# Patient Record
Sex: FEMALE | Race: OTHER | HISPANIC OR LATINO | Employment: UNEMPLOYED | URBAN - METROPOLITAN AREA
[De-identification: names, ages, dates, MRNs, and addresses within clinical notes are randomized per-mention and may not be internally consistent; named-entity substitution may affect disease eponyms.]

---

## 2022-06-29 ENCOUNTER — HOSPITAL ENCOUNTER (EMERGENCY)
Facility: HOSPITAL | Age: 32
Discharge: HOME | End: 2022-06-29
Attending: INTERNAL MEDICINE

## 2022-06-29 VITALS
DIASTOLIC BLOOD PRESSURE: 84 MMHG | SYSTOLIC BLOOD PRESSURE: 147 MMHG | RESPIRATION RATE: 16 BRPM | BODY MASS INDEX: 24.46 KG/M2 | OXYGEN SATURATION: 100 % | HEART RATE: 82 BPM | HEIGHT: 64 IN | TEMPERATURE: 97 F | WEIGHT: 143.31 LBS

## 2022-06-29 DIAGNOSIS — R42 DIZZINESS: Primary | ICD-10-CM

## 2022-06-29 LAB
ALBUMIN SERPL-MCNC: 4.2 GM/DL (ref 3.5–5)
ALBUMIN/GLOB SERPL: 1.2 RATIO (ref 1.1–2)
ALP SERPL-CCNC: 77 UNIT/L (ref 40–150)
ALT SERPL-CCNC: 19 UNIT/L (ref 0–55)
AMPHET UR QL SCN: NEGATIVE
APPEARANCE UR: CLEAR
AST SERPL-CCNC: 19 UNIT/L (ref 5–34)
B-HCG UR QL: NEGATIVE
BACTERIA #/AREA URNS AUTO: ABNORMAL /HPF
BARBITURATE SCN PRESENT UR: NEGATIVE
BASOPHILS # BLD AUTO: 0.04 X10(3)/MCL (ref 0–0.2)
BASOPHILS NFR BLD AUTO: 0.6 %
BENZODIAZ UR QL SCN: NEGATIVE
BILIRUB UR QL STRIP.AUTO: NEGATIVE MG/DL
BILIRUBIN DIRECT+TOT PNL SERPL-MCNC: 0.5 MG/DL
BUN SERPL-MCNC: 6.9 MG/DL (ref 7–18.7)
CALCIUM SERPL-MCNC: 10 MG/DL (ref 8.4–10.2)
CANNABINOIDS UR QL SCN: NEGATIVE
CHLORIDE SERPL-SCNC: 103 MMOL/L (ref 98–107)
CK SERPL-CCNC: 69 U/L (ref 29–168)
CO2 SERPL-SCNC: 30 MMOL/L (ref 22–29)
COCAINE UR QL SCN: NEGATIVE
COLOR UR AUTO: COLORLESS
CREAT SERPL-MCNC: 0.71 MG/DL (ref 0.55–1.02)
CTP QC/QA: YES
EOSINOPHIL # BLD AUTO: 0.16 X10(3)/MCL (ref 0–0.9)
EOSINOPHIL NFR BLD AUTO: 2.3 %
ERYTHROCYTE [DISTWIDTH] IN BLOOD BY AUTOMATED COUNT: 13.1 % (ref 11.5–17)
FENTANYL UR QL SCN: NEGATIVE
GLOBULIN SER-MCNC: 3.4 GM/DL (ref 2.4–3.5)
GLUCOSE SERPL-MCNC: 107 MG/DL (ref 74–100)
GLUCOSE UR QL STRIP.AUTO: NORMAL MG/DL
HCT VFR BLD AUTO: 42.2 % (ref 37–47)
HGB BLD-MCNC: 13.5 GM/DL (ref 12–16)
HYALINE CASTS #/AREA URNS LPF: ABNORMAL /LPF
IMM GRANULOCYTES # BLD AUTO: 0.03 X10(3)/MCL (ref 0–0.02)
IMM GRANULOCYTES NFR BLD AUTO: 0.4 % (ref 0–0.43)
KETONES UR QL STRIP.AUTO: NEGATIVE MG/DL
LEUKOCYTE ESTERASE UR QL STRIP.AUTO: NEGATIVE UNIT/L
LYMPHOCYTES # BLD AUTO: 2.23 X10(3)/MCL (ref 0.6–4.6)
LYMPHOCYTES NFR BLD AUTO: 31.8 %
MCH RBC QN AUTO: 29.1 PG (ref 27–31)
MCHC RBC AUTO-ENTMCNC: 32 MG/DL (ref 33–36)
MCV RBC AUTO: 90.9 FL (ref 80–94)
MDMA UR QL SCN: NEGATIVE
MONOCYTES # BLD AUTO: 0.39 X10(3)/MCL (ref 0.1–1.3)
MONOCYTES NFR BLD AUTO: 5.6 %
MUCOUS THREADS URNS QL MICRO: ABNORMAL /LPF
NEUTROPHILS # BLD AUTO: 4.2 X10(3)/MCL (ref 2.1–9.2)
NEUTROPHILS NFR BLD AUTO: 59.3 %
NITRITE UR QL STRIP.AUTO: NEGATIVE
NRBC BLD AUTO-RTO: 0 %
OPIATES UR QL SCN: NEGATIVE
PCP UR QL: NEGATIVE
PH UR STRIP.AUTO: 7.5 [PH]
PH UR: 7.5 [PH] (ref 3–11)
PLATELET # BLD AUTO: 281 X10(3)/MCL (ref 130–400)
PMV BLD AUTO: 9.4 FL (ref 9.4–12.4)
POTASSIUM SERPL-SCNC: 4.3 MMOL/L (ref 3.5–5.1)
PROT SERPL-MCNC: 7.6 GM/DL (ref 6.4–8.3)
PROT UR QL STRIP.AUTO: NEGATIVE MG/DL
RBC # BLD AUTO: 4.64 X10(6)/MCL (ref 4.2–5.4)
RBC #/AREA URNS AUTO: ABNORMAL /HPF
RBC UR QL AUTO: NEGATIVE UNIT/L
SODIUM SERPL-SCNC: 141 MMOL/L (ref 136–145)
SP GR UR STRIP.AUTO: 1
SPECIFIC GRAVITY, URINE AUTO (.000) (OHS): 1 (ref 1–1.03)
SQUAMOUS #/AREA URNS LPF: ABNORMAL /HPF
UROBILINOGEN UR STRIP-ACNC: NORMAL MG/DL
WBC # SPEC AUTO: 7 X10(3)/MCL (ref 4.5–11.5)
WBC #/AREA URNS AUTO: ABNORMAL /HPF

## 2022-06-29 PROCEDURE — 25000003 PHARM REV CODE 250: Performed by: NURSE PRACTITIONER

## 2022-06-29 PROCEDURE — 85025 COMPLETE CBC W/AUTO DIFF WBC: CPT | Performed by: NURSE PRACTITIONER

## 2022-06-29 PROCEDURE — 81001 URINALYSIS AUTO W/SCOPE: CPT | Performed by: NURSE PRACTITIONER

## 2022-06-29 PROCEDURE — 96360 HYDRATION IV INFUSION INIT: CPT

## 2022-06-29 PROCEDURE — 80053 COMPREHEN METABOLIC PANEL: CPT | Performed by: NURSE PRACTITIONER

## 2022-06-29 PROCEDURE — 36415 COLL VENOUS BLD VENIPUNCTURE: CPT | Performed by: NURSE PRACTITIONER

## 2022-06-29 PROCEDURE — 80307 DRUG TEST PRSMV CHEM ANLYZR: CPT | Performed by: NURSE PRACTITIONER

## 2022-06-29 PROCEDURE — 81025 URINE PREGNANCY TEST: CPT | Performed by: NURSE PRACTITIONER

## 2022-06-29 PROCEDURE — 99284 EMERGENCY DEPT VISIT MOD MDM: CPT | Mod: 25

## 2022-06-29 PROCEDURE — 82550 ASSAY OF CK (CPK): CPT | Performed by: NURSE PRACTITIONER

## 2022-06-29 RX ADMIN — SODIUM CHLORIDE 1000 ML: 9 INJECTION, SOLUTION INTRAVENOUS at 03:06

## 2022-06-29 NOTE — ED PROVIDER NOTES
Encounter Date: 6/29/2022       History     Chief Complaint   Patient presents with    Dizziness     Pt to ed c/o dizziness and weakness that began yesterday. Describes the dizziness as everything around her spins. Pt reports nausea. Pt also requesting gyn referral     The patient presents with dizziness.  The onset was 1 day ago.  The course/duration of symptoms is constant and fluctuating in intensity.  The character of symptoms is lightheaded.  The degree at present is minimal.  The exacerbating factor is changing position.  There are relieving factors including lying down and rest.  Risk factors consist of none.  Prior episodes: occasional.  Therapy today: none.  Associated symptoms: mild occasional nausea, mild headache, denies chest pain, denies vomiting, denies shortness of breath, denies abdominal pain, denies syncope, denies palpitations, denies altered vision, denies altered speech, denies altered coordination, denies focal weakness, denies altered sensation, denies confusion, denies seizure, denies blood in stool and denies vaginal bleeding. Video  used.        Review of patient's allergies indicates:  No Known Allergies  History reviewed. No pertinent past medical history.  History reviewed. No pertinent surgical history.  History reviewed. No pertinent family history.  Social History     Tobacco Use    Smoking status: Never Smoker    Smokeless tobacco: Never Used   Substance Use Topics    Alcohol use: Never    Drug use: Never     Review of Systems   Constitutional: Negative for fever.   HENT: Negative for sore throat.    Respiratory: Negative for shortness of breath.    Cardiovascular: Negative for chest pain.   Gastrointestinal: Negative for nausea.   Genitourinary: Negative for dysuria.   Musculoskeletal: Negative for back pain.   Skin: Negative for rash.   Neurological: Positive for light-headedness. Negative for weakness.   Hematological: Does not bruise/bleed easily.   All other  systems reviewed and are negative.      Physical Exam     Initial Vitals [06/29/22 1352]   BP Pulse Resp Temp SpO2   (!) 157/94 78 16 97.2 °F (36.2 °C) 100 %      MAP       --         Physical Exam    Nursing note and vitals reviewed.  Constitutional: She appears well-developed and well-nourished.   HENT:   Head: Normocephalic and atraumatic.   Neck: Neck supple.   Normal range of motion.  Cardiovascular: Normal rate, regular rhythm, normal heart sounds and intact distal pulses.   Pulmonary/Chest: Breath sounds normal.   Abdominal: Abdomen is soft. Bowel sounds are normal.   Musculoskeletal:         General: Normal range of motion.      Cervical back: Normal range of motion and neck supple.     Neurological: She is alert and oriented to person, place, and time. She has normal strength. No cranial nerve deficit.   Skin: Skin is warm and dry.   Psychiatric: She has a normal mood and affect.         ED Course   Procedures  Labs Reviewed   COMPREHENSIVE METABOLIC PANEL - Abnormal; Notable for the following components:       Result Value    Carbon Dioxide 30 (*)     Glucose Level 107 (*)     Blood Urea Nitrogen 6.9 (*)     All other components within normal limits   URINALYSIS, REFLEX TO URINE CULTURE - Abnormal; Notable for the following components:    Bacteria, UA Trace (*)     Mucous, UA Trace (*)     All other components within normal limits   CBC WITH DIFFERENTIAL - Abnormal; Notable for the following components:    MCHC 32.0 (*)     IG# 0.03 (*)     All other components within normal limits   DRUG SCREEN, URINE (BEAKER) - Normal    Narrative:     Cut off concentrations:    Amphetamines - 1000 ng/ml  Barbiturates - 200 ng/ml  Benzodiazepine - 200 ng/ml  Cannabinoids (THC) - 50 ng/ml  Cocaine - 300 ng/ml  Fentanyl - 1.0 ng/ml  MDMA - 500 ng/ml  Opiates - 300 ng/ml   Phencyclidine (PCP) - 25 ng/ml    Specimen submitted for drug analysis and tested for pH and specific gravity in order to evaluate sample integrity.  Suspect tampering if specific gravity is <1.003 and/or pH is not within the range of 4.5 - 8.0  False negatives may result form substances such as bleach added to urine.  False positives may result for the presence of a substance with similar chemical structure to the drug or its metabolite.    This test provides only a PRELIMINARY analytical test result. A more specific alternate chemical method must be used in order to obtain a confirmed analytical result. Gas chromatography/mass spectrometry (GC/MS) is the preferred confirmatory method. Other chemical confirmation methods are available. Clinical consideration and professional judgement should be applied to any drug of abuse test result, particularly when preliminary positive results are used.    Positive results will be confirmed only at the physicians request. Unconfirmed screening results are to be used only for medical purposes (treatment).        CK - Normal   CBC W/ AUTO DIFFERENTIAL    Narrative:     The following orders were created for panel order CBC auto differential.  Procedure                               Abnormality         Status                     ---------                               -----------         ------                     CBC with Differential[[id number]]        Abnormal            Final result                 Please view results for these tests on the individual orders.   EXTRA TUBES    Narrative:     The following orders were created for panel order EXTRA TUBES.  Procedure                               Abnormality         Status                     ---------                               -----------         ------                     Light Blue Top Hold[[id number]]                              In process                 Gold Top Hold[[id number]]                                    In process                   Please view results for these tests on the individual orders.   LIGHT BLUE TOP HOLD   GOLD TOP HOLD   POCT URINE PREGNANCY           Imaging Results    None          Medications   sodium chloride 0.9% bolus 1,000 mL (0 mLs Intravenous Stopped 6/29/22 7549)     Medical Decision Making:   History:   Old Records Summarized: records from clinic visits and records from previous admission(s).  Feels better after ivf and wishes to go home, will refer to the medicine clinic per request for a pcp and the gyn clinic per request for a routine pap smear, strict return to ER instructions given    4:41 PM DISPOSITION: The patient is resting comfortably in no acute distress.  She is hemodynamically stable and is without objective evidence for acute process requiring urgent intervention or hospitalization. I provided counseling to patient with regard to condition, the treatment plan, specific conditions for return, and the importance of follow up. Detailed written and verbal instructions provided to patient and she expressed a verbal understanding of the discharge instructions and overall management plan. Reiterated the importance of medication administration and safety and advised patient to follow up with primary care provider in 3-5 days or sooner if needed.  Answered questions at this time. The patient is stable for discharge. Video  was used.                      Clinical Impression:   Final diagnoses:  [R42] Dizziness (Primary)          ED Disposition Condition    Discharge Stable        ED Prescriptions     None        Follow-up Information     Follow up With Specialties Details Why Contact Info    referrals sent to medicine clinic per request for a pcp        referral sent to GYN clinic per request for routine pap smear        [facility] Emergency Medicine  If symptoms worsen [address] [facility] [id number] [phone]           Anastacio Oconnor, TIMOTHY  06/29/22 6829

## 2023-07-14 ENCOUNTER — HOSPITAL ENCOUNTER (EMERGENCY)
Facility: HOSPITAL | Age: 33
Discharge: HOME | End: 2023-07-14
Attending: INTERNAL MEDICINE

## 2023-07-14 VITALS
DIASTOLIC BLOOD PRESSURE: 64 MMHG | HEIGHT: 64 IN | HEART RATE: 96 BPM | TEMPERATURE: 98 F | RESPIRATION RATE: 18 BRPM | BODY MASS INDEX: 24.84 KG/M2 | WEIGHT: 145.5 LBS | OXYGEN SATURATION: 100 % | SYSTOLIC BLOOD PRESSURE: 114 MMHG

## 2023-07-14 DIAGNOSIS — O03.4 INCOMPLETE MISCARRIAGE: ICD-10-CM

## 2023-07-14 DIAGNOSIS — R10.9 ABDOMINAL PAIN, UNSPECIFIED ABDOMINAL LOCATION: Primary | ICD-10-CM

## 2023-07-14 DIAGNOSIS — O03.4 INCOMPLETE ABORTION: ICD-10-CM

## 2023-07-14 LAB
ABORH RETYPE: NORMAL
ADDITIONAL FINDINGS (OHS): ABNORMAL
ALBUMIN SERPL-MCNC: 3.4 G/DL (ref 3.5–5)
ALBUMIN/GLOB SERPL: 0.9 RATIO (ref 1.1–2)
ALP SERPL-CCNC: 61 UNIT/L (ref 40–150)
ALT SERPL-CCNC: 10 UNIT/L (ref 0–55)
APPEARANCE UR: ABNORMAL
AST SERPL-CCNC: 11 UNIT/L (ref 5–34)
B-HCG FREE SERPL-ACNC: ABNORMAL MIU/ML
B-HCG UR QL: POSITIVE
BACTERIA #/AREA URNS AUTO: ABNORMAL /HPF
BASOPHILS # BLD AUTO: 0.04 X10(3)/MCL
BASOPHILS NFR BLD AUTO: 0.2 %
BILIRUB UR QL STRIP.AUTO: NEGATIVE
BILIRUBIN DIRECT+TOT PNL SERPL-MCNC: 0.4 MG/DL
BUN SERPL-MCNC: <3 MG/DL (ref 7–18.7)
C TRACH DNA SPEC QL NAA+PROBE: NOT DETECTED
CALCIUM SERPL-MCNC: 9.2 MG/DL (ref 8.4–10.2)
CHLORIDE SERPL-SCNC: 105 MMOL/L (ref 98–107)
CO2 SERPL-SCNC: 21 MMOL/L (ref 22–29)
COLOR UR: YELLOW
CREAT SERPL-MCNC: 0.67 MG/DL (ref 0.55–1.02)
CTP QC/QA: YES
EOSINOPHIL # BLD AUTO: 0.02 X10(3)/MCL (ref 0–0.9)
EOSINOPHIL NFR BLD AUTO: 0.1 %
ERYTHROCYTE [DISTWIDTH] IN BLOOD BY AUTOMATED COUNT: 13.4 % (ref 11.5–17)
GFR SERPLBLD CREATININE-BSD FMLA CKD-EPI: >60 MLS/MIN/1.73/M2
GLOBULIN SER-MCNC: 3.6 GM/DL (ref 2.4–3.5)
GLUCOSE SERPL-MCNC: 113 MG/DL (ref 74–100)
GLUCOSE UR QL STRIP.AUTO: NORMAL
GROUP & RH: NORMAL
GROUP & RH: NORMAL
HCT VFR BLD AUTO: 35.8 % (ref 37–47)
HGB BLD-MCNC: 12.4 G/DL (ref 12–16)
HYALINE CASTS #/AREA URNS LPF: ABNORMAL /LPF
IMM GRANULOCYTES # BLD AUTO: 0.17 X10(3)/MCL (ref 0–0.04)
IMM GRANULOCYTES NFR BLD AUTO: 0.7 %
INDIRECT COOMBS GEL: NORMAL
KETONES UR QL STRIP.AUTO: ABNORMAL
LEUKOCYTE ESTERASE UR QL STRIP.AUTO: 500
LYMPHOCYTES # BLD AUTO: 0.8 X10(3)/MCL (ref 0.6–4.6)
LYMPHOCYTES NFR BLD AUTO: 3.4 %
MCH RBC QN AUTO: 30.5 PG (ref 27–31)
MCHC RBC AUTO-ENTMCNC: 34.6 G/DL (ref 33–36)
MCV RBC AUTO: 88 FL (ref 80–94)
MONOCYTES # BLD AUTO: 0.82 X10(3)/MCL (ref 0.1–1.3)
MONOCYTES NFR BLD AUTO: 3.5 %
MUCOUS THREADS URNS QL MICRO: ABNORMAL /LPF
N GONORRHOEA DNA SPEC QL NAA+PROBE: NOT DETECTED
NEUTROPHILS # BLD AUTO: 21.59 X10(3)/MCL (ref 2.1–9.2)
NEUTROPHILS NFR BLD AUTO: 92.1 %
NITRITE UR QL STRIP.AUTO: NEGATIVE
NRBC BLD AUTO-RTO: 0 %
PH UR STRIP.AUTO: 6 [PH]
PLATELET # BLD AUTO: 262 X10(3)/MCL (ref 130–400)
PLATELET # BLD EST: ADEQUATE 10*3/UL
PMV BLD AUTO: 9.6 FL (ref 7.4–10.4)
POTASSIUM SERPL-SCNC: 3.1 MMOL/L (ref 3.5–5.1)
PROT SERPL-MCNC: 7 GM/DL (ref 6.4–8.3)
PROT UR QL STRIP.AUTO: ABNORMAL
RBC # BLD AUTO: 4.07 X10(6)/MCL (ref 4.2–5.4)
RBC #/AREA URNS AUTO: ABNORMAL /HPF
RBC MORPH BLD: ABNORMAL
RBC UR QL AUTO: ABNORMAL
SODIUM SERPL-SCNC: 135 MMOL/L (ref 136–145)
SP GR UR STRIP.AUTO: 1.02
SPECIMEN OUTDATE: NORMAL
SQUAMOUS #/AREA URNS LPF: ABNORMAL /HPF
UROBILINOGEN UR STRIP-ACNC: NORMAL
WBC # SPEC AUTO: 23.44 X10(3)/MCL (ref 4.5–11.5)
WBC #/AREA URNS AUTO: ABNORMAL /HPF

## 2023-07-14 PROCEDURE — 63600175 PHARM REV CODE 636 W HCPCS: Performed by: PHYSICIAN ASSISTANT

## 2023-07-14 PROCEDURE — 84702 CHORIONIC GONADOTROPIN TEST: CPT | Performed by: PHYSICIAN ASSISTANT

## 2023-07-14 PROCEDURE — 86900 BLOOD TYPING SEROLOGIC ABO: CPT | Mod: 91 | Performed by: PHYSICIAN ASSISTANT

## 2023-07-14 PROCEDURE — 99285 EMERGENCY DEPT VISIT HI MDM: CPT | Mod: 25

## 2023-07-14 PROCEDURE — 85025 COMPLETE CBC W/AUTO DIFF WBC: CPT | Performed by: PHYSICIAN ASSISTANT

## 2023-07-14 PROCEDURE — 96361 HYDRATE IV INFUSION ADD-ON: CPT

## 2023-07-14 PROCEDURE — 86900 BLOOD TYPING SEROLOGIC ABO: CPT | Performed by: PHYSICIAN ASSISTANT

## 2023-07-14 PROCEDURE — 96374 THER/PROPH/DIAG INJ IV PUSH: CPT

## 2023-07-14 PROCEDURE — 87591 N.GONORRHOEAE DNA AMP PROB: CPT

## 2023-07-14 PROCEDURE — 25000003 PHARM REV CODE 250

## 2023-07-14 PROCEDURE — 80053 COMPREHEN METABOLIC PANEL: CPT | Performed by: PHYSICIAN ASSISTANT

## 2023-07-14 PROCEDURE — 87088 URINE BACTERIA CULTURE: CPT | Performed by: PHYSICIAN ASSISTANT

## 2023-07-14 PROCEDURE — 81025 URINE PREGNANCY TEST: CPT | Performed by: PHYSICIAN ASSISTANT

## 2023-07-14 PROCEDURE — 25000003 PHARM REV CODE 250: Performed by: PHYSICIAN ASSISTANT

## 2023-07-14 PROCEDURE — 81001 URINALYSIS AUTO W/SCOPE: CPT | Performed by: PHYSICIAN ASSISTANT

## 2023-07-14 RX ORDER — MORPHINE SULFATE 2 MG/ML
2 INJECTION, SOLUTION INTRAMUSCULAR; INTRAVENOUS
Status: COMPLETED | OUTPATIENT
Start: 2023-07-14 | End: 2023-07-14

## 2023-07-14 RX ORDER — HYDROCODONE BITARTRATE AND ACETAMINOPHEN 5; 325 MG/1; MG/1
1 TABLET ORAL
Status: COMPLETED | OUTPATIENT
Start: 2023-07-14 | End: 2023-07-14

## 2023-07-14 RX ORDER — MISOPROSTOL 200 UG/1
800 TABLET ORAL DAILY
Qty: 4 TABLET | Refills: 0 | Status: SHIPPED | OUTPATIENT
Start: 2023-07-14 | End: 2023-07-15

## 2023-07-14 RX ORDER — MISOPROSTOL 200 UG/1
800 TABLET ORAL DAILY
Qty: 4 TABLET | Refills: 0 | Status: SHIPPED | OUTPATIENT
Start: 2023-07-14 | End: 2023-07-14 | Stop reason: SDUPTHER

## 2023-07-14 RX ORDER — KETOROLAC TROMETHAMINE 10 MG/1
10 TABLET, FILM COATED ORAL EVERY 6 HOURS
Qty: 20 TABLET | Refills: 0 | Status: SHIPPED | OUTPATIENT
Start: 2023-07-14 | End: 2023-07-19

## 2023-07-14 RX ORDER — ACETAMINOPHEN 325 MG/1
650 TABLET ORAL
Status: COMPLETED | OUTPATIENT
Start: 2023-07-14 | End: 2023-07-14

## 2023-07-14 RX ORDER — MISOPROSTOL 100 UG/1
800 TABLET ORAL ONCE
Status: COMPLETED | OUTPATIENT
Start: 2023-07-14 | End: 2023-07-14

## 2023-07-14 RX ORDER — MISOPROSTOL 100 UG/1
800 TABLET ORAL ONCE
Status: DISCONTINUED | OUTPATIENT
Start: 2023-07-14 | End: 2023-07-14

## 2023-07-14 RX ADMIN — MORPHINE SULFATE 2 MG: 2 INJECTION, SOLUTION INTRAMUSCULAR; INTRAVENOUS at 02:07

## 2023-07-14 RX ADMIN — SODIUM CHLORIDE, POTASSIUM CHLORIDE, SODIUM LACTATE AND CALCIUM CHLORIDE 1000 ML: 600; 310; 30; 20 INJECTION, SOLUTION INTRAVENOUS at 02:07

## 2023-07-14 RX ADMIN — MISOPROSTOL 800 MCG: 100 TABLET ORAL at 02:07

## 2023-07-14 RX ADMIN — ACETAMINOPHEN 650 MG: 325 TABLET, FILM COATED ORAL at 11:07

## 2023-07-14 RX ADMIN — HYDROCODONE BITARTRATE AND ACETAMINOPHEN 1 TABLET: 5; 325 TABLET ORAL at 05:07

## 2023-07-14 NOTE — H&P
[facility] OB/GYN CLINIC NOTE  [facility]  [address] [place] [address]  Phone: [phone]  Fax: [phone]    [facility] Gynecology Consult - Resident Note    Consulting Physician: Lm  Reason for consult: incomplete   Subjective:        HPI:   Tiff Carrero is an 32 y.o. year old  ( vaginal x4) with no PMHx who presents to ED with abominal pain. Reports adbominal cramping that started last night. On admission b-HCG quant was 69k . Pt states that her LMP was in April ( approx )    Abdominal US revealed intrauterine pregnancy with complete absence of amniotic fluid and suspected fetus at the lower uterine segment/cervical os. On physical examination by ED physician, a fetus was removed from the vaginal vault and the umbilical cord was noted to be detached. Placenta presumed to be still intact. After removal of products of conception, there was moderate intermittent bleeding coming from the cervical os.    Review of systems  Negative unless noted in HPI    Past Medical History  No PMHx    Past Surgical History  Denies hx of surgery    Obstetrical History    Vaginal delivery x4   Children born in [place]     Gynecologic History  Patient's last menstrual period was 2023 (approximate).      Allergies  Denies     Family History  No family history on file.    Social History  Social History     Tobacco Use    Smoking status: Never    Smokeless tobacco: Never   Substance Use Topics    Alcohol use: Never    Drug use: Never       Overview of active problems  There are no problems to display for this patient.      Medications  Home medications  (Not in a hospital admission)      Current Inpatient medications  No current facility-administered medications for this encounter.    Current Outpatient Medications:     miSOPROStoL (CYTOTEC) 200 MCG Tab, Place 4 tablets (800 mcg total) inside cheek once daily. for 1 day, Disp: 4 tablet, Rfl: 0       Objective:     Vitals:    23 1413  07/14/23 1428 07/14/23 1429 07/14/23 1443   BP: 110/67 111/74  130/83   BP Location:       Patient Position:       Pulse: 107 103  101   Resp:   18 18   Temp:       TempSrc:       SpO2: 99% 98%  95%   Weight:       Height:         Body mass index is 24.98 kg/m².    No intake/output data recorded.    Physical Exam:  General: alert and oriented, in no acute distress   Lungs: clear to auscultation bilaterally   Heart: regular rate and rhythm   Abdomen: Soft, non-distended, non-tender   Bowel Sounds: Active   DVT Evaluation: No cords or calf tenderness.  No significant calf/ankle edema.   Extremities: Normal, atraumatic, non-edematous   External genitalia: Normal female genitalia without lesion, discharge or tenderness. Blood noted on bilateral labia   Bimanual Exam: No cervical motion tenderness. SVE 1 cm dilated. Uterus freely mobile. Normal size uterus. No adnexal masses. Appropriately tender to palpoation    Speculum Exam: Vaginal vault with copious blood. Ringed forceps removed blood clots obscuring the cervix. Cervix visualized closed with blood coming from cervical os.   Note: RN chaperone present for entirety of exam.    Results:     LABS  Trended:  Recent Labs   Lab 07/14/23  1114   WBC 23.44*   HGB 12.4   HCT 35.8*      MCV 88.0   RDW 13.4   *   K 3.1*   CO2 21*   BUN <3.0*   CREATININE 0.67   CALCIUM 9.2   ALBUMIN 3.4*   BILITOT 0.4   AST 11   ALT 10   ALKPHOS 61     Recent Results (from the past 24 hour(s))   Comprehensive Metabolic Panel    Collection Time: 07/14/23 11:14 AM   Result Value Ref Range    Sodium Level 135 (L) 136 - 145 mmol/L    Potassium Level 3.1 (L) 3.5 - 5.1 mmol/L    Chloride 105 98 - 107 mmol/L    Carbon Dioxide 21 (L) 22 - 29 mmol/L    Glucose Level 113 (H) 74 - 100 mg/dL    Blood Urea Nitrogen <3.0 (L) 7.0 - 18.7 mg/dL    Creatinine 0.67 0.55 - 1.02 mg/dL    Calcium Level Total 9.2 8.4 - 10.2 mg/dL    Protein Total 7.0 6.4 - 8.3 gm/dL    Albumin Level 3.4 (L) 3.5 - 5.0  g/dL    Globulin 3.6 (H) 2.4 - 3.5 gm/dL    Albumin/Globulin Ratio 0.9 (L) 1.1 - 2.0 ratio    Bilirubin Total 0.4 <=1.5 mg/dL    Alkaline Phosphatase 61 40 - 150 unit/L    Alanine Aminotransferase 10 0 - 55 unit/L    Aspartate Aminotransferase 11 5 - 34 unit/L    eGFR >60 mls/min/1.73/m2   CBC with Differential    Collection Time: 07/14/23 11:14 AM   Result Value Ref Range    WBC 23.44 (H) 4.50 - 11.50 x10(3)/mcL    RBC 4.07 (L) 4.20 - 5.40 x10(6)/mcL    Hgb 12.4 12.0 - 16.0 g/dL    Hct 35.8 (L) 37.0 - 47.0 %    MCV 88.0 80.0 - 94.0 fL    MCH 30.5 27.0 - 31.0 pg    MCHC 34.6 33.0 - 36.0 g/dL    RDW 13.4 11.5 - 17.0 %    Platelet 262 130 - 400 x10(3)/mcL    MPV 9.6 7.4 - 10.4 fL    Neut % 92.1 %    Lymph % 3.4 %    Mono % 3.5 %    Eos % 0.1 %    Basophil % 0.2 %    Lymph # 0.80 0.6 - 4.6 x10(3)/mcL    Neut # 21.59 (H) 2.1 - 9.2 x10(3)/mcL    Mono # 0.82 0.1 - 1.3 x10(3)/mcL    Eos # 0.02 0 - 0.9 x10(3)/mcL    Baso # 0.04 <=0.2 x10(3)/mcL    IG# 0.17 (H) 0 - 0.04 x10(3)/mcL    IG% 0.7 %    NRBC% 0.0 %   hCG, quantitative, pregnancy    Collection Time: 07/14/23 11:14 AM   Result Value Ref Range    Beta Human Chorionic Gonadotropin Quantitative 69,348.08 (H) <=5.00 mIU/mL   Blood Smear Microscopic Exam    Collection Time: 07/14/23 11:14 AM   Result Value Ref Range    RBC Morph Abnormal (A) Normal    Additional Findings White cell count verified on smear     Platelets Adequate Normal, Adequate   ABORH RETYPE    Collection Time: 07/14/23 11:14 AM   Result Value Ref Range    ABORH Retype O POS    POCT urine pregnancy    Collection Time: 07/14/23 11:28 AM   Result Value Ref Range    POC Preg Test, Ur Positive (A) Negative     Acceptable Yes    Urinalysis, Reflex to Urine Culture    Collection Time: 07/14/23 11:47 AM    Specimen: Urine   Result Value Ref Range    Color, UA Yellow Yellow, Light-Yellow, Dark Yellow, Martha, Straw    Appearance, UA Turbid (A) Clear    Specific Gravity, UA 1.016     pH, UA 6.0 5.0  - 8.5    Protein, UA Trace (A) Negative    Glucose, UA Normal Negative, Normal    Ketones, UA Trace (A) Negative    Blood, UA 3+ (A) Negative    Bilirubin, UA Negative Negative    Urobilinogen, UA Normal 0.2, 1.0, Normal    Nitrites, UA Negative Negative    Leukocyte Esterase,  (A) Negative    WBC, UA 51-99 (A) None Seen, 0-2, 3-5, 0-5 /HPF    Bacteria, UA None Seen None Seen /HPF    Squamous Epithelial Cells, UA Moderate (A) None Seen /HPF    Mucous, UA Trace (A) None Seen /LPF    Hyaline Casts, UA None Seen None Seen /lpf    RBC, UA 0-5 None Seen, 0-2, 3-5, 0-5 /HPF   Type & Screen    Collection Time: 07/14/23  2:23 PM   Result Value Ref Range    Group & Rh O POS     Indirect Adan GEL NEG     Specimen Outdate 07/17/2023 23:59    ABO/Rh    Collection Time: 07/14/23  2:23 PM   Result Value Ref Range    Group & Rh O POS         MICRO:  No pertinent new    IMAGING    TVUS 07/14:  TECHNIQUE:  Transabdominal sonography of the pelvis was performed.     COMPARISON:  No relevant prior available for comparison.     FINDINGS:  The uterus measures 14.2 x 9.7 x 8.9 cm.     Abnormal appearance of the uterus and uterine contents.  There is what appears to be an intrauterine gestation with complete absence of amniotic fluid.  Echogenic area at the posterior uterine fundus is favored to represent placenta.   Structure favored to represent the fetus is seen the lower uterine segment/internal cervical os.  This is poorly delineated in the collapsed amniotic sac.  As such difficult to assess cardiac activity on these transabdominal images.     Along the margin of the placenta within the endometrial cavity there are bright echogenic spectral reflectors with dirty shadowing suggesting gas as can be seen with infection.     RIGHT ADNEXA: Normal     LEFT ADNEXA: Normal.     PERITONEUM:No significant free intraperitoneal fluid.     Impression:     Intrauterine pregnancy with complete absence of amniotic fluid and suspected fetus  at the lower uterine segment/cervical os most compatible with ruptured membranes.  It was difficult to confidently identify the fetus in the absence of amniotic fluid and resultant poor acoustic window; as such unable to confidently assess cardiac activity.  (Note: LMP dating 14 weeks 6 days.  Unable to perform fetal biometry for sonographic dating.)     Bright echogenic reflectors at the endometrial cavity with dirty acoustic shadowing suspicious for gas.  Correlate for infection.     Recommend OBGYN evaluation.     This report was flagged in Epic as abnormal.        Electronically signed by: Ashley Adkins  Date:                                            2023  Time:                                           13:56     Assessment:     Tiff Carrero is a 32 y.o. .Seen in the ED.    [facility] consulted for assistance with incomplete .      Recommendations:     Incomplete :   - LMP .   -b-HCG : 69K  -US 0714:  with evidence of fetal products in the lower uterine segment/ endocervix    -bedside US: without evidence of intrauterine pregnancy. Blood products visualize within the intrauterine cavity.   -GC/CT pending    Counseling :   - Patient having miscarriage diagnosed  delivery of fetus and ultrasound findings  - She is Rh positive. Thus not requiring RhoGAM administration.  - She is hemodynamically stable by vitals signs, and cbc, and shows no signs of infection  - She was counseled on options including medical, surgical and expectant management with plan to proceed with medical management  - Discussed early miscarriage likely consistent with aneuploidy   - Placed 800 mcg cytotec vaginally in the ED with plan for an additional dose in 24 hours, also given toradol, percocet and zofran for symptom control  - Discussed strict return precautions and indications for surgical managements     Discussed with staff, Dr. Provost Simona Mckeon MD   [facility], PGY2

## 2023-07-14 NOTE — CONSULTS
[facility] OB/GYN CLINIC NOTE  [facility]  [address] [place] [address]  Phone: [phone]  Fax: [phone]    [facility] Gynecology Consult - Resident Note    Consulting Physician: Lm  Reason for consult: incomplete   Subjective:        HPI:   Tiff Carrero is an 32 y.o. year old  ( vaginal x4) with no PMHx who presented to ED with abdominal cramping that started last night. On admission b-HCG quant was 69k . Pt states that her LMP was in April ( approx ).    Abdominal US revealed intrauterine pregnancy with complete absence of amniotic fluid and suspected fetus at the lower uterine segment/cervical os. After US completion, fetal parts palpated un vaginal vault; fetus removed and umbilical cord was noted to be detached. Placenta presumed to be still intrauterine. After removal of products of conception, patient experienced moderate intermittent bleeding coming from the cervical os. Denies fever, chills, lightheadedness, weakness, SOB.    Review of systems  Negative unless noted in HPI    Past Medical History  No PMHx    Past Surgical History  Denies hx of surgery    Obstetrical History    Vaginal delivery x4   Children born in [place]     Gynecologic History  Patient's last menstrual period was 2023 (approximate).  Denies h/o STD    Allergies  Denies     Social History  Social History     Tobacco Use    Smoking status: Never    Smokeless tobacco: Never   Substance Use Topics    Alcohol use: Never    Drug use: Never       Medications  Home medications  none    Current Inpatient medications  No current facility-administered medications for this encounter.    Current Outpatient Medications:     miSOPROStoL (CYTOTEC) 200 MCG Tab, Place 4 tablets (800 mcg total) inside cheek once daily. for 1 day, Disp: 4 tablet, Rfl: 0       Objective:     Vitals:    23 1413 23 1428 23 1429 23 1443   BP: 110/67 111/74  130/83   BP Location:       Patient Position:        Pulse: 107 103  101   Resp:   18 18   Temp:       TempSrc:       SpO2: 99% 98%  95%   Weight:       Height:         Body mass index is 24.98 kg/m².    No intake/output data recorded.    Physical Exam:  General: alert and oriented, in no acute distress   Lungs: clear to auscultation bilaterally   Heart: regular rate and rhythm   Abdomen: Soft, non-distended, mildly ttp without guarding or rebound   Bowel Sounds: Active   DVT Evaluation: No cords or calf tenderness.  No significant calf/ankle edema.   Extremities: Normal, atraumatic, non-edematous   External genitalia: Normal female genitalia without lesion, discharge or tenderness. Blood noted on bilateral labia   Bimanual Exam: No cervical motion tenderness. Cervix 1 cm dilated. Uterus freely mobile. Normal size uterus. No adnexal masses. Appropriately tender to palpation   Speculum Exam: Vaginal vault with large amount of blood clots on speculum exam. Ringed forceps removed blood clots obscuring the cervix. Once vault evacuated of clots, cervix visualized as open, with slow bleeding. No purulent discharge or malodor.   Note: RN chaperone present for entirety of exam.    Results:     LABS  Trended:  Recent Labs   Lab 07/14/23  1114   WBC 23.44*   HGB 12.4   HCT 35.8*      MCV 88.0   RDW 13.4   *   K 3.1*   CO2 21*   BUN <3.0*   CREATININE 0.67   CALCIUM 9.2   ALBUMIN 3.4*   BILITOT 0.4   AST 11   ALT 10   ALKPHOS 61     Recent Results (from the past 24 hour(s))   Comprehensive Metabolic Panel    Collection Time: 07/14/23 11:14 AM   Result Value Ref Range    Sodium Level 135 (L) 136 - 145 mmol/L    Potassium Level 3.1 (L) 3.5 - 5.1 mmol/L    Chloride 105 98 - 107 mmol/L    Carbon Dioxide 21 (L) 22 - 29 mmol/L    Glucose Level 113 (H) 74 - 100 mg/dL    Blood Urea Nitrogen <3.0 (L) 7.0 - 18.7 mg/dL    Creatinine 0.67 0.55 - 1.02 mg/dL    Calcium Level Total 9.2 8.4 - 10.2 mg/dL    Protein Total 7.0 6.4 - 8.3 gm/dL    Albumin Level 3.4 (L) 3.5 - 5.0  g/dL    Globulin 3.6 (H) 2.4 - 3.5 gm/dL    Albumin/Globulin Ratio 0.9 (L) 1.1 - 2.0 ratio    Bilirubin Total 0.4 <=1.5 mg/dL    Alkaline Phosphatase 61 40 - 150 unit/L    Alanine Aminotransferase 10 0 - 55 unit/L    Aspartate Aminotransferase 11 5 - 34 unit/L    eGFR >60 mls/min/1.73/m2   CBC with Differential    Collection Time: 07/14/23 11:14 AM   Result Value Ref Range    WBC 23.44 (H) 4.50 - 11.50 x10(3)/mcL    RBC 4.07 (L) 4.20 - 5.40 x10(6)/mcL    Hgb 12.4 12.0 - 16.0 g/dL    Hct 35.8 (L) 37.0 - 47.0 %    MCV 88.0 80.0 - 94.0 fL    MCH 30.5 27.0 - 31.0 pg    MCHC 34.6 33.0 - 36.0 g/dL    RDW 13.4 11.5 - 17.0 %    Platelet 262 130 - 400 x10(3)/mcL    MPV 9.6 7.4 - 10.4 fL    Neut % 92.1 %    Lymph % 3.4 %    Mono % 3.5 %    Eos % 0.1 %    Basophil % 0.2 %    Lymph # 0.80 0.6 - 4.6 x10(3)/mcL    Neut # 21.59 (H) 2.1 - 9.2 x10(3)/mcL    Mono # 0.82 0.1 - 1.3 x10(3)/mcL    Eos # 0.02 0 - 0.9 x10(3)/mcL    Baso # 0.04 <=0.2 x10(3)/mcL    IG# 0.17 (H) 0 - 0.04 x10(3)/mcL    IG% 0.7 %    NRBC% 0.0 %   hCG, quantitative, pregnancy    Collection Time: 07/14/23 11:14 AM   Result Value Ref Range    Beta Human Chorionic Gonadotropin Quantitative 69,348.08 (H) <=5.00 mIU/mL   Blood Smear Microscopic Exam    Collection Time: 07/14/23 11:14 AM   Result Value Ref Range    RBC Morph Abnormal (A) Normal    Additional Findings White cell count verified on smear     Platelets Adequate Normal, Adequate   ABORH RETYPE    Collection Time: 07/14/23 11:14 AM   Result Value Ref Range    ABORH Retype O POS    POCT urine pregnancy    Collection Time: 07/14/23 11:28 AM   Result Value Ref Range    POC Preg Test, Ur Positive (A) Negative     Acceptable Yes    Urinalysis, Reflex to Urine Culture    Collection Time: 07/14/23 11:47 AM    Specimen: Urine   Result Value Ref Range    Color, UA Yellow Yellow, Light-Yellow, Dark Yellow, Martha, Straw    Appearance, UA Turbid (A) Clear    Specific Gravity, UA 1.016     pH, UA 6.0 5.0  - 8.5    Protein, UA Trace (A) Negative    Glucose, UA Normal Negative, Normal    Ketones, UA Trace (A) Negative    Blood, UA 3+ (A) Negative    Bilirubin, UA Negative Negative    Urobilinogen, UA Normal 0.2, 1.0, Normal    Nitrites, UA Negative Negative    Leukocyte Esterase,  (A) Negative    WBC, UA 51-99 (A) None Seen, 0-2, 3-5, 0-5 /HPF    Bacteria, UA None Seen None Seen /HPF    Squamous Epithelial Cells, UA Moderate (A) None Seen /HPF    Mucous, UA Trace (A) None Seen /LPF    Hyaline Casts, UA None Seen None Seen /lpf    RBC, UA 0-5 None Seen, 0-2, 3-5, 0-5 /HPF   Type & Screen    Collection Time: 07/14/23  2:23 PM   Result Value Ref Range    Group & Rh O POS     Indirect Adan GEL NEG     Specimen Outdate 07/17/2023 23:59    ABO/Rh    Collection Time: 07/14/23  2:23 PM   Result Value Ref Range    Group & Rh O POS         MICRO:  No pertinent new    IMAGING    TVUS 07/14:  TECHNIQUE:  Transabdominal sonography of the pelvis was performed.     COMPARISON:  No relevant prior available for comparison.     FINDINGS:  The uterus measures 14.2 x 9.7 x 8.9 cm.     Abnormal appearance of the uterus and uterine contents.  There is what appears to be an intrauterine gestation with complete absence of amniotic fluid.  Echogenic area at the posterior uterine fundus is favored to represent placenta.   Structure favored to represent the fetus is seen the lower uterine segment/internal cervical os.  This is poorly delineated in the collapsed amniotic sac.  As such difficult to assess cardiac activity on these transabdominal images.     Along the margin of the placenta within the endometrial cavity there are bright echogenic spectral reflectors with dirty shadowing suggesting gas as can be seen with infection.     RIGHT ADNEXA: Normal     LEFT ADNEXA: Normal.     PERITONEUM:No significant free intraperitoneal fluid.     Impression:     Intrauterine pregnancy with complete absence of amniotic fluid and suspected fetus  at the lower uterine segment/cervical os most compatible with ruptured membranes.  It was difficult to confidently identify the fetus in the absence of amniotic fluid and resultant poor acoustic window; as such unable to confidently assess cardiac activity.  (Note: LMP dating 14 weeks 6 days.  Unable to perform fetal biometry for sonographic dating.)     Bright echogenic reflectors at the endometrial cavity with dirty acoustic shadowing suspicious for gas.  Correlate for infection.     Recommend OBGYN evaluation.     This report was flagged in Epic as abnormal.        Electronically signed by: Ashley Adkins  Date:                                            2023  Time:                                           13:56    Bedside US after removal of fetus from vault:  intrauterine pregnancy not visualized. Placental and blood clot noted within the intrauterine cavity.      Assessment:     Tiff Carrero is a 32 y.o.  with incomplete .  [facility] consulted for assistance with incomplete .  Hemodynamically stable with normal starting H/H, afebrile, without fundal tenderness.  Recommendations:     Incomplete : presented with abdominal pain, on initial US fetal products noted in lower uterine segment; post US fetal parts removed from vaginal vault. Placenta not yet expelled. Hemodynamically stable, with normal starting H/H. Afebrile, nontachycardic. Counseled patient on medical, surgical and expectant management options for management of incomplete ; patient desires medical management.  - Rh positive, rhogam not indicated  - F/up GC/CT pending  - Placed 800 mcg cytotec vaginally in the ED with plan for an additional dose in 24 hours, also given toradol, percocet and zofran for symptom control  - Discussed strict return precautions and indications for surgical managements     Discussed with staff, Dr. Provost Simona Mckeon MD   [facility], PGY2      Kelly Balderas MD  [facility] PGY3  07/14/2023 5:53 PM

## 2023-07-14 NOTE — ED PROVIDER NOTES
Encounter Date: 2023       History     Chief Complaint   Patient presents with    Abdominal Pain    Vaginal Discharge     PT UNSURE IF PREGNANT, REPORTS LMP 23. DID NOT DO PREG. TEST.  CO LOWER ABD CRAMPS W DYSURIA AND YELLOW VAG DC. SINCE YESTERDAY.   .      Patient reports to the ER with complaints of generalized lower abdominal pain and cramping since last night; pt reports her LMP was 23 and she may be pregnant but is unsure and has not done a home pregnancy test; pt is     The history is provided by the patient.   Abdominal Pain  The current episode started yesterday. The onset of the illness was abrupt. The abdominal pain is located in the suprapubic region. The severity of the abdominal pain is 5/10. The other symptoms of the illness include vaginal discharge. The other symptoms of the illness do not include fever, shortness of breath, nausea, vomiting or dysuria.   The vaginal discharge is not associated with dysuria.   Symptoms associated with the illness do not include back pain. Significant associated medical issues do not include diabetes, sickle cell disease, substance abuse or HIV.   Review of patient's allergies indicates:  No Known Allergies  No past medical history on file.  No past surgical history on file.  No family history on file.  Social History     Tobacco Use    Smoking status: Never    Smokeless tobacco: Never   Substance Use Topics    Alcohol use: Never    Drug use: Never     Review of Systems   Constitutional:  Negative for fever.   HENT:  Negative for sore throat.    Eyes: Negative.    Respiratory:  Negative for shortness of breath.    Cardiovascular:  Negative for chest pain.   Gastrointestinal:  Positive for abdominal pain. Negative for nausea and vomiting.   Genitourinary:  Positive for vaginal discharge. Negative for dysuria.   Musculoskeletal:  Negative for back pain.   Skin:  Negative for rash.   Neurological:  Negative for weakness.   Hematological:  Does not  bruise/bleed easily.   Psychiatric/Behavioral: Negative.       Physical Exam     Initial Vitals [07/14/23 1058]   BP Pulse Resp Temp SpO2   113/72 107 16 98.1 °F (36.7 °C) 100 %      MAP       --         Physical Exam    Vitals reviewed.  Constitutional: She appears well-developed and well-nourished.   HENT:   Head: Normocephalic and atraumatic.   Eyes: Conjunctivae and EOM are normal. Pupils are equal, round, and reactive to light.   Neck: Neck supple.   Normal range of motion.  Cardiovascular:  Normal rate, regular rhythm, normal heart sounds and intact distal pulses.           No murmur heard.  Pulmonary/Chest: Breath sounds normal. No respiratory distress. She has no wheezes. She exhibits no tenderness.   Abdominal: Abdomen is soft. Bowel sounds are normal. There is abdominal tenderness in the suprapubic area.   Genitourinary:    Pelvic exam was performed with patient supine.   There is no rash or tenderness on the right labia. There is no rash or tenderness on the left labia.    Vaginal discharge and tenderness present.   There is tenderness in the vagina.    Genitourinary Comments: Performed exam with Gisella FLAHERTY at bedside; appearance of products of conception during exam; stopped exam and informed DR Smith who came bedside to exam patient - GYN paged at that time (still pending official u/s report)     Musculoskeletal:         General: Normal range of motion.      Cervical back: Normal range of motion and neck supple.     Neurological: She is alert and oriented to person, place, and time. She displays normal reflexes. No cranial nerve deficit or sensory deficit.   Skin: Skin is warm and dry.   Psychiatric: She has a normal mood and affect. Her behavior is normal. Judgment and thought content normal.       ED Course   Procedures  Labs Reviewed   URINALYSIS, REFLEX TO URINE CULTURE - Abnormal; Notable for the following components:       Result Value    Appearance, UA Turbid (*)     Protein, UA Trace (*)      Ketones, UA Trace (*)     Blood, UA 3+ (*)     Leukocyte Esterase,  (*)     WBC, UA 51-99 (*)     Squamous Epithelial Cells, UA Moderate (*)     Mucous, UA Trace (*)     All other components within normal limits   COMPREHENSIVE METABOLIC PANEL - Abnormal; Notable for the following components:    Sodium Level 135 (*)     Potassium Level 3.1 (*)     Carbon Dioxide 21 (*)     Glucose Level 113 (*)     Blood Urea Nitrogen <3.0 (*)     Albumin Level 3.4 (*)     Globulin 3.6 (*)     Albumin/Globulin Ratio 0.9 (*)     All other components within normal limits   CBC WITH DIFFERENTIAL - Abnormal; Notable for the following components:    WBC 23.44 (*)     RBC 4.07 (*)     Hct 35.8 (*)     Neut # 21.59 (*)     IG# 0.17 (*)     All other components within normal limits   HCG, QUANTITATIVE - Abnormal; Notable for the following components:    Beta Human Chorionic Gonadotropin Quantitative 69,348.08 (*)     All other components within normal limits   BLOOD SMEAR MICROSCOPIC EXAM ([facility]) - Abnormal; Notable for the following components:    RBC Morph Abnormal (*)     All other components within normal limits   POCT URINE PREGNANCY - Abnormal; Notable for the following components:    POC Preg Test, Ur Positive (*)     All other components within normal limits   CULTURE, URINE   CHLAMYDIA/GONORRHOEAE(GC), PCR   CBC W/ AUTO DIFFERENTIAL    Narrative:     The following orders were created for panel order CBC auto differential.  Procedure                               Abnormality         Status                     ---------                               -----------         ------                     CBC with Differential[[id number]]        Abnormal            Final result                 Please view results for these tests on the individual orders.   EXTRA TUBES    Narrative:     The following orders were created for panel order EXTRA TUBES.  Procedure                               Abnormality         Status                      ---------                               -----------         ------                     Light Blue Top Hold[[id number]]                              In process                 Gold Top Hold[[id number]]                                    In process                   Please view results for these tests on the individual orders.   LIGHT BLUE TOP HOLD   GOLD TOP HOLD   EXTRA TUBES    Narrative:     The following orders were created for panel order EXTRA TUBES.  Procedure                               Abnormality         Status                     ---------                               -----------         ------                     Red Top Hold[[id number]]                                     In process                   Please view results for these tests on the individual orders.   RED TOP HOLD   TYPE & SCREEN   GROUP & RH   ABORH RETYPE          Imaging Results               US OB <14 Wks, TransAbd, Single Gestation (Final result)  Result time 07/14/23 13:56:15   Procedure changed from US OB <14 Wks TransAbd & TransVag, Single Gestation (XPD)     Final result by Ashley Adkins MD (07/14/23 13:56:15)                   Impression:      Intrauterine pregnancy with complete absence of amniotic fluid and suspected fetus at the lower uterine segment/cervical os most compatible with ruptured membranes.  It was difficult to confidently identify the fetus in the absence of amniotic fluid and resultant poor acoustic window; as such unable to confidently assess cardiac activity.  (Note: LMP dating 14 weeks 6 days.  Unable to perform fetal biometry for sonographic dating.)    Bright echogenic reflectors at the endometrial cavity with dirty acoustic shadowing suspicious for gas.  Correlate for infection.    Recommend OBGYN evaluation.    This report was flagged in Epic as abnormal.      Electronically signed by: Ashley Adkins  Date:    07/14/2023  Time:    13:56               Narrative:    EXAMINATION:  US OB <14 WEEKS  TRANSABDOM, SINGLE GESTATION    CLINICAL HISTORY:  abdominal pain and discharge;    TECHNIQUE:  Transabdominal sonography of the pelvis was performed.    COMPARISON:  No relevant prior available for comparison.    FINDINGS:  The uterus measures 14.2 x 9.7 x 8.9 cm.    Abnormal appearance of the uterus and uterine contents.  There is what appears to be an intrauterine gestation with complete absence of amniotic fluid.  Echogenic area at the posterior uterine fundus is favored to represent placenta.   Structure favored to represent the fetus is seen the lower uterine segment/internal cervical os.  This is poorly delineated in the collapsed amniotic sac.  As such difficult to assess cardiac activity on these transabdominal images.    Along the margin of the placenta within the endometrial cavity there are bright echogenic spectral reflectors with dirty shadowing suggesting gas as can be seen with infection.    RIGHT ADNEXA: Normal    LEFT ADNEXA: Normal.    PERITONEUM:No significant free intraperitoneal fluid.                                       Medications   acetaminophen tablet 650 mg (650 mg Oral Given 23 1146)   morphine injection 2 mg (2 mg Intravenous Given 23 1429)   lactated ringers bolus 1,000 mL (0 mLs Intravenous Stopped 23 1539)   lactated ringers bolus 1,000 mL (0 mLs Intravenous Stopped 23 1539)   miSOPROStoL tablet 800 mcg (800 mcg Vaginal Given by Other 23 1459)     Medical Decision Making:   ED Management:    Exam performed by DR Amato (ER) and GYN (Dr Mckeon) . . . See GYN note for detailed recommendations; patient given cytotec in the ER as well as 800 mcg from pharmacy from pharmacy in order to take medication tomm buccal (per GYN recommendation) . . . GYN gave ER precautions to patient and what to look for, I reinforced this;  used to explain details regarded passed products of conception/fetus and next steps in patient wants to have a /cremation  or having hospital dispose - pt signed forms     Given strict ED return precautions. I have spoken with the patient and/or caregivers. I have explained the patient's condition, diagnoses and treatment plan based on the information available to me at this time. I have answered the patient's and/or caregiver's questions and addressed any concerns. The patient and/or caregivers have as good an understanding of the patient's diagnosis, condition and treatment plan as can be expected at this point. The vital signs have been stable. The patient's condition is stable and appropriate for discharge from the emergency department.      The patient will pursue further outpatient evaluation with the primary care physician or other designated or consulting physician as outlined in the discharge instructions. The patient and/or caregivers are agreeable to this plan of care and follow-up instructions have been explained in detail. The patient and/or caregivers have received these instructions in written format and have expressed an understanding of the discharge instructions. The patient and/or caregivers are aware that any significant change in condition or worsening of symptoms should prompt an immediate return to this or the closest emergency department or a call to 911.                          Clinical Impression:   Final diagnoses:  [R10.9] Abdominal pain, unspecified abdominal location (Primary)  [O03.4] Incomplete miscarriage               SHIRA Oliveira  07/14/23 3033

## 2023-07-14 NOTE — FIRST PROVIDER EVALUATION
Medical screening examination initiated.  I have conducted a focused provider triage encounter, findings are as follows:    Brief history of present illness:   patient reports low abdominal pain since last night. Concerned she is pregnant. LMP was 3 months ago. Has not taken a home pregnancy test.       There were no vitals filed for this visit.    Pertinent physical exam:  Vitals stable     Brief workup plan:  Labs     Preliminary workup initiated; this workup will be continued and followed by the physician or advanced practice provider that is assigned to the patient when roomed.

## 2023-07-16 LAB — BACTERIA UR CULT: NO GROWTH

## 2023-07-17 ENCOUNTER — TELEPHONE (OUTPATIENT)
Dept: EMERGENCY MEDICINE | Facility: HOSPITAL | Age: 33
End: 2023-07-17

## 2023-07-17 DIAGNOSIS — O03.4 INCOMPLETE ABORTION: Primary | ICD-10-CM

## 2023-07-17 PROCEDURE — 88305 TISSUE EXAM BY PATHOLOGIST: CPT | Mod: TC | Performed by: INTERNAL MEDICINE

## 2023-07-19 LAB
ESTROGEN SERPL-MCNC: NORMAL PG/ML
INSULIN SERPL-ACNC: NORMAL U[IU]/ML
LAB AP CLINICAL INFORMATION: NORMAL
LAB AP GROSS DESCRIPTION: NORMAL
LAB AP REPORT FOOTNOTES: NORMAL
T3RU NFR SERPL: NORMAL %

## 2024-05-02 ENCOUNTER — HOSPITAL ENCOUNTER (EMERGENCY)
Facility: HOSPITAL | Age: 34
Discharge: HOME | End: 2024-05-02
Attending: INTERNAL MEDICINE

## 2024-05-02 VITALS
DIASTOLIC BLOOD PRESSURE: 65 MMHG | RESPIRATION RATE: 14 BRPM | HEIGHT: 64 IN | SYSTOLIC BLOOD PRESSURE: 132 MMHG | HEART RATE: 76 BPM | TEMPERATURE: 99 F | OXYGEN SATURATION: 100 % | BODY MASS INDEX: 25.03 KG/M2 | WEIGHT: 146.63 LBS

## 2024-05-02 DIAGNOSIS — N34.1 URETHRITIS, NONGONOCOCCAL: ICD-10-CM

## 2024-05-02 DIAGNOSIS — D50.8 OTHER IRON DEFICIENCY ANEMIA: Primary | ICD-10-CM

## 2024-05-02 DIAGNOSIS — Z34.90 EARLY STAGE OF PREGNANCY: ICD-10-CM

## 2024-05-02 LAB
ALBUMIN SERPL-MCNC: 4 G/DL (ref 3.5–5)
ALBUMIN/GLOB SERPL: 1.1 RATIO (ref 1.1–2)
ALP SERPL-CCNC: 49 UNIT/L (ref 40–150)
ALT SERPL-CCNC: 14 UNIT/L (ref 0–55)
ANISOCYTOSIS BLD QL SMEAR: ABNORMAL
APPEARANCE UR: CLEAR
AST SERPL-CCNC: 15 UNIT/L (ref 5–34)
B-HCG FREE SERPL-ACNC: ABNORMAL MIU/ML
B-HCG UR QL: POSITIVE
BACTERIA #/AREA URNS AUTO: ABNORMAL /HPF
BASOPHILS # BLD AUTO: 0.03 X10(3)/MCL
BASOPHILS NFR BLD AUTO: 0.4 %
BILIRUB SERPL-MCNC: 0.2 MG/DL
BILIRUB UR QL STRIP.AUTO: NEGATIVE
BUN SERPL-MCNC: 6.9 MG/DL (ref 7–18.7)
C TRACH DNA SPEC QL NAA+PROBE: NOT DETECTED
CALCIUM SERPL-MCNC: 9.4 MG/DL (ref 8.4–10.2)
CHLORIDE SERPL-SCNC: 110 MMOL/L (ref 98–107)
CLUE CELLS VAG QL WET PREP: ABNORMAL
CO2 SERPL-SCNC: 21 MMOL/L (ref 22–29)
COLOR UR AUTO: COLORLESS
CREAT SERPL-MCNC: 0.69 MG/DL (ref 0.55–1.02)
CTP QC/QA: YES
ELLIPTOCYTOSIS (OHS): SLIGHT
EOSINOPHIL # BLD AUTO: 0.1 X10(3)/MCL (ref 0–0.9)
EOSINOPHIL NFR BLD AUTO: 1.4 %
ERYTHROCYTE [DISTWIDTH] IN BLOOD BY AUTOMATED COUNT: 19 % (ref 11.5–17)
GFR SERPLBLD CREATININE-BSD FMLA CKD-EPI: >60 MLS/MIN/1.73/M2
GLOBULIN SER-MCNC: 3.5 GM/DL (ref 2.4–3.5)
GLUCOSE SERPL-MCNC: 74 MG/DL (ref 74–100)
GLUCOSE UR QL STRIP.AUTO: NORMAL
HCT VFR BLD AUTO: 28.5 % (ref 37–47)
HGB BLD-MCNC: 8.4 G/DL (ref 12–16)
HOLD SPECIMEN: NORMAL
HOLD SPECIMEN: NORMAL
HYALINE CASTS #/AREA URNS LPF: ABNORMAL /LPF
HYPOCHROMIA BLD QL SMEAR: SLIGHT
IMM GRANULOCYTES # BLD AUTO: 0.01 X10(3)/MCL (ref 0–0.04)
IMM GRANULOCYTES NFR BLD AUTO: 0.1 %
KETONES UR QL STRIP.AUTO: NEGATIVE
LEUKOCYTE ESTERASE UR QL STRIP.AUTO: 25
LYMPHOCYTES # BLD AUTO: 2.12 X10(3)/MCL (ref 0.6–4.6)
LYMPHOCYTES NFR BLD AUTO: 29.4 %
MCH RBC QN AUTO: 19.6 PG (ref 27–31)
MCHC RBC AUTO-ENTMCNC: 29.5 G/DL (ref 33–36)
MCV RBC AUTO: 66.6 FL (ref 80–94)
MICROCYTES BLD QL SMEAR: ABNORMAL
MONOCYTES # BLD AUTO: 0.58 X10(3)/MCL (ref 0.1–1.3)
MONOCYTES NFR BLD AUTO: 8 %
N GONORRHOEA DNA SPEC QL NAA+PROBE: NOT DETECTED
NEUTROPHILS # BLD AUTO: 4.37 X10(3)/MCL (ref 2.1–9.2)
NEUTROPHILS NFR BLD AUTO: 60.7 %
NITRITE UR QL STRIP.AUTO: NEGATIVE
NRBC BLD AUTO-RTO: 0 %
OVALOCYTES (OLG): SLIGHT
PH UR STRIP.AUTO: 6.5 [PH]
PLATELET # BLD AUTO: 363 X10(3)/MCL (ref 130–400)
PLATELET # BLD EST: NORMAL 10*3/UL
PMV BLD AUTO: 9 FL (ref 7.4–10.4)
POIKILOCYTOSIS BLD QL SMEAR: ABNORMAL
POTASSIUM SERPL-SCNC: 4 MMOL/L (ref 3.5–5.1)
PROT SERPL-MCNC: 7.5 GM/DL (ref 6.4–8.3)
PROT UR QL STRIP.AUTO: NEGATIVE
RBC # BLD AUTO: 4.28 X10(6)/MCL (ref 4.2–5.4)
RBC #/AREA URNS AUTO: ABNORMAL /HPF
RBC MORPH BLD: ABNORMAL
RBC UR QL AUTO: NEGATIVE
SODIUM SERPL-SCNC: 140 MMOL/L (ref 136–145)
SOURCE (OHS): NORMAL
SP GR UR STRIP.AUTO: <1.005 (ref 1–1.03)
SQUAMOUS #/AREA URNS LPF: ABNORMAL /HPF
T VAGINALIS VAG QL WET PREP: ABNORMAL
UROBILINOGEN UR STRIP-ACNC: NORMAL
WBC # SPEC AUTO: 7.21 X10(3)/MCL (ref 4.5–11.5)
WBC #/AREA URNS AUTO: ABNORMAL /HPF
WBC #/AREA VAG WET PREP: ABNORMAL
YEAST SPEC QL WET PREP: ABNORMAL

## 2024-05-02 PROCEDURE — 85025 COMPLETE CBC W/AUTO DIFF WBC: CPT | Performed by: PHYSICIAN ASSISTANT

## 2024-05-02 PROCEDURE — 81025 URINE PREGNANCY TEST: CPT | Performed by: PHYSICIAN ASSISTANT

## 2024-05-02 PROCEDURE — 87491 CHLMYD TRACH DNA AMP PROBE: CPT | Performed by: PHYSICIAN ASSISTANT

## 2024-05-02 PROCEDURE — 87591 N.GONORRHOEAE DNA AMP PROB: CPT | Performed by: PHYSICIAN ASSISTANT

## 2024-05-02 PROCEDURE — 81001 URINALYSIS AUTO W/SCOPE: CPT | Performed by: PHYSICIAN ASSISTANT

## 2024-05-02 PROCEDURE — 99284 EMERGENCY DEPT VISIT MOD MDM: CPT | Mod: 25

## 2024-05-02 PROCEDURE — 84702 CHORIONIC GONADOTROPIN TEST: CPT | Performed by: PHYSICIAN ASSISTANT

## 2024-05-02 PROCEDURE — 25000003 PHARM REV CODE 250: Performed by: PHYSICIAN ASSISTANT

## 2024-05-02 PROCEDURE — 80053 COMPREHEN METABOLIC PANEL: CPT | Performed by: PHYSICIAN ASSISTANT

## 2024-05-02 PROCEDURE — 87210 SMEAR WET MOUNT SALINE/INK: CPT | Performed by: PHYSICIAN ASSISTANT

## 2024-05-02 RX ORDER — ACETAMINOPHEN 500 MG
500 TABLET ORAL
Status: COMPLETED | OUTPATIENT
Start: 2024-05-02 | End: 2024-05-02

## 2024-05-02 RX ORDER — CEPHALEXIN 500 MG/1
500 CAPSULE ORAL EVERY 6 HOURS
Qty: 28 CAPSULE | Refills: 0 | Status: SHIPPED | OUTPATIENT
Start: 2024-05-02 | End: 2024-05-02 | Stop reason: CLARIF

## 2024-05-02 RX ADMIN — ACETAMINOPHEN 500 MG: 500 TABLET ORAL at 06:05

## 2024-05-02 NOTE — ED PROVIDER NOTES
Encounter Date: 2024       History     Chief Complaint   Patient presents with    Dysuria    Abdominal Pain     Dysuria with pelvic pain x 4 days     33 year old Azeri speaking female,  (1 miscarriage), presents to the emergency department with complaints of lower abdominal pain with dysuria x 4 days.   LMP was in February however patient states her cycles are very irregular.  Patient rates her pain 4/10.  She denies hematuria, vaginal discharge, fever, chills, nausea, vomiting, chest pain, SOB.      The history is provided by the patient. A  was used.     Review of patient's allergies indicates:  No Known Allergies  No past medical history on file.  No past surgical history on file.  No family history on file.  Social History     Tobacco Use    Smoking status: Never    Smokeless tobacco: Never   Substance Use Topics    Alcohol use: Never    Drug use: Never     Review of Systems   Constitutional:  Negative for chills and fever.   Eyes: Negative.    Respiratory:  Negative for cough, chest tightness and shortness of breath.    Cardiovascular:  Negative for chest pain and palpitations.   Gastrointestinal:  Positive for abdominal pain (lower). Negative for diarrhea, nausea and vomiting.   Genitourinary:  Positive for dysuria. Negative for decreased urine volume, hematuria, vaginal bleeding and vaginal discharge.   Musculoskeletal:  Negative for back pain.   Skin:  Negative for rash and wound.   Neurological:  Negative for dizziness, light-headedness, numbness and headaches.       Physical Exam     Initial Vitals [24 1633]   BP Pulse Resp Temp SpO2   132/65 76 14 98.9 °F (37.2 °C) 100 %      MAP       --         Physical Exam    Nursing note and vitals reviewed.  Constitutional: She appears well-developed and well-nourished.   HENT:   Nose: Nose normal.   Eyes: Conjunctivae are normal.   Neck: Neck supple.   Normal range of motion.  Cardiovascular:  Normal rate, regular rhythm, normal  heart sounds and intact distal pulses.           Pulmonary/Chest: Breath sounds normal.   Abdominal: Abdomen is soft. Bowel sounds are normal. There is no abdominal tenderness. There is no rebound and no guarding.   Musculoskeletal:         General: Normal range of motion.      Cervical back: Normal range of motion and neck supple.     Neurological: She is alert.   Skin: Skin is warm. Capillary refill takes less than 2 seconds.         ED Course   Procedures  Labs Reviewed   WET PREP, GENITAL - Abnormal; Notable for the following components:       Result Value    WBC, Wet Prep Few (*)     All other components within normal limits   URINALYSIS, REFLEX TO URINE CULTURE - Abnormal; Notable for the following components:    Color, UA Colorless (*)     Specific Gravity, UA <1.005 (*)     Leukocyte Esterase, UA 25 (*)     Squamous Epithelial Cells, UA Trace (*)     All other components within normal limits   HCG, QUANTITATIVE - Abnormal; Notable for the following components:    Beta Human Chorionic Gonadotropin Quantitative 48,777.91 (*)     All other components within normal limits   COMPREHENSIVE METABOLIC PANEL - Abnormal; Notable for the following components:    Chloride 110 (*)     Carbon Dioxide 21 (*)     Blood Urea Nitrogen 6.9 (*)     All other components within normal limits   CBC WITH DIFFERENTIAL - Abnormal; Notable for the following components:    Hgb 8.4 (*)     Hct 28.5 (*)     MCV 66.6 (*)     MCH 19.6 (*)     MCHC 29.5 (*)     RDW 19.0 (*)     All other components within normal limits   BLOOD SMEAR MICROSCOPIC EXAM (OLG) - Abnormal; Notable for the following components:    RBC Morph Abnormal (*)     Anisocytosis 1+ (*)     Elliptocytosis Slight (*)     Hypochromasia Slight (*)     Microcytosis 1+ (*)     Ovalocytes Slight (*)     Poikilocytosis 1+ (*)     All other components within normal limits   POCT URINE PREGNANCY - Abnormal; Notable for the following components:    POC Preg Test, Ur Positive (*)      All other components within normal limits   CHLAMYDIA/GONORRHOEAE(GC), PCR    Narrative:     The Xpert CT/NG test, performed on the GeneXpert system is a qualitative in vitro real-time polymerase chain reaction (PCR) test for the automated detected and differentiation for genomic DNA from Chlamydia trachomatis (CT) and/or Neisseria gonorrhoeae (NG).   CBC W/ AUTO DIFFERENTIAL    Narrative:     The following orders were created for panel order CBC Auto Differential.  Procedure                               Abnormality         Status                     ---------                               -----------         ------                     CBC with Differential[[id number]]        Abnormal            Final result                 Please view results for these tests on the individual orders.   EXTRA TUBES    Narrative:     The following orders were created for panel order EXTRA TUBES.  Procedure                               Abnormality         Status                     ---------                               -----------         ------                     Light Blue Top Hold[[id number]]                              Final result               Red Top Hold[[id number]]                                     Final result                 Please view results for these tests on the individual orders.   LIGHT BLUE TOP HOLD   RED TOP HOLD          Imaging Results              US OB <14 Wks TransAbd & TransVag, Single Gestation (XPD) (Final result)  Result time 05/02/24 19:20:52      Final result by Kym Montgomery MD (05/02/24 19:20:52)                   Impression:      1. Single intrauterine gestational sac with AUA of 6 weeks and 5 days.  No fetal pole identified at this time.  2. No adnexal mass or free fluid.      Electronically signed by: Kym Montgomery  Date:    05/02/2024  Time:    19:20               Narrative:    EXAMINATION:  US OB <14 WEEKS, TRANSABDOM & TRANSVAG, SINGLE GESTATION (XPD)    CLINICAL  HISTORY:  abdominal pain, pregnancy;    TECHNIQUE:  Ob ultrasound    COMPARISON:  Ultrasound dated 2023    FINDINGS:  The uterus measures 8.1 x 5.7 x 5.6 cm in size.  A single intrauterine gestational sac is identified.  The mean sac diameter measures 1.8 cm corresponding to a gestational age of 6 weeks and 5 days.  There is no fetal pole or yolk sac identified at this time.    The right ovary is not well visualized.  Left ovary measures 2.9 x 2.4 x 2.4 cm in size and contains vascular flow.  There is no adnexal mass or free fluid.                                       Medications   acetaminophen tablet 500 mg (500 mg Oral Given 24 180)     Medical Decision Making  33 year old Kazakh speaking female,  (1 miscarriage), presents to the emergency department with complaints of lower abdominal pain with dysuria x 4 days.   LMP was in February however patient states her cycles are very irregular.  Patient rates her pain 4/10.  She denies hematuria, vaginal discharge, fever, chills, nausea, vomiting, chest pain, SOB.      DDx:  UTI, STI, pregnancy, threatened miscarriage    HC,777.  CBC shows anemia with H&H 8.4/28.5 which has significantly dropped within the last 9 months.  CMP unremarkable.  Wet prep normal.    Urinalysis + for leukocytes. With patient being pregnant and having, dysuria will treat Keflex.     Ultrasound showed Single intrauterine gestational sac with AUA of 6 weeks and 5 days.  No fetal pole identified at this time. No adnexal mass or free fluid.    We will get her to come back in 72 hours to redraw HCG levels.  This pregnancy possible for blighted ovum      Amount and/or Complexity of Data Reviewed  Labs: ordered. Decision-making details documented in ED Course.  Radiology: ordered.    Risk  OTC drugs.  Prescription drug management.      Additional MDM:   Differential Diagnosis:   Ectopic pregnancy, UTI             ED Course as of 24   Thu May 02, 2024   1652 hCG  Qualitative, Urine(!): Positive [ER]   1839 Beta HCG Quant(!): 48,777.91 [ER]   1855 Hemoglobin(!): 8.4 [ER]   1855 Hematocrit(!): 28.5 [ER]   1859 Patient transitioned to Nellie Slade at this time pending US [ER]   1901 Leukocyte Esterase, UA(!): 25 [ER]      ED Course User Index  [ER] Debora Alvarez PA                           Clinical Impression:  Final diagnoses:  [D50.8] Other iron deficiency anemia (Primary)  [Z34.90] Early stage of pregnancy  [N34.1] Urethritis, nongonococcal          ED Disposition Condition    Discharge Stable          ED Prescriptions       Medication Sig Dispense Start Date End Date Auth. Provider    cephALEXin (KEFLEX) 500 MG capsule  (Status: Discontinued) Take 1 capsule (500 mg total) by mouth every 6 (six) hours. for 7 days 28 capsule 5/2/2024 5/2/2024 Debora Alvarez PA          Follow-up Information       Follow up With Specialties Details Why Contact Info    [facility] Emergency Dept Emergency Medicine  As needed, If symptoms worsen [address] [facility] [id number] [phone]    Ron Powell MD Internal Medicine On 5/5/2024 for beta HCG [address] [facility] [address]  [phone]               Nellie Slade, FN  05/02/24 2005

## 2024-05-06 ENCOUNTER — HOSPITAL ENCOUNTER (EMERGENCY)
Facility: HOSPITAL | Age: 34
Discharge: HOME | End: 2024-05-06
Attending: EMERGENCY MEDICINE

## 2024-05-06 VITALS
WEIGHT: 142.63 LBS | RESPIRATION RATE: 20 BRPM | DIASTOLIC BLOOD PRESSURE: 68 MMHG | HEIGHT: 64 IN | OXYGEN SATURATION: 100 % | HEART RATE: 68 BPM | BODY MASS INDEX: 24.35 KG/M2 | SYSTOLIC BLOOD PRESSURE: 114 MMHG | TEMPERATURE: 98 F

## 2024-05-06 DIAGNOSIS — Z3A.01 LESS THAN 8 WEEKS GESTATION OF PREGNANCY: Primary | ICD-10-CM

## 2024-05-06 LAB
B-HCG FREE SERPL-ACNC: ABNORMAL MIU/ML
B-HCG SERPL QL: POSITIVE
HOLD SPECIMEN: NORMAL

## 2024-05-06 PROCEDURE — 84703 CHORIONIC GONADOTROPIN ASSAY: CPT | Performed by: NURSE PRACTITIONER

## 2024-05-06 PROCEDURE — 99283 EMERGENCY DEPT VISIT LOW MDM: CPT

## 2024-05-06 PROCEDURE — 84702 CHORIONIC GONADOTROPIN TEST: CPT | Performed by: NURSE PRACTITIONER

## 2024-05-06 NOTE — ED PROVIDER NOTES
Encounter Date: 2024       History     Chief Complaint   Patient presents with    Follow-up     Came back for follow up ultrasound from  ER visit. No vaginal bleeding or pain at present.      On 2024 patient presented with dysuria and pelvic pain for 4 days.  HCG and ultrasound were done.  HCG level was 48,777.91 and ultrasound showed Single intrauterine gestational sac with AUA of 6 weeks and 5 days.  No fetal pole identified at this time. No adnexal mass or free fluid.  She is  6 para 4 with 1 miscarriage.  Her last menstrual cycle was in 2024.  She has currently not experiencing any pain or discharge at this time.  No bleeding as well.  She is here for a repeat HCG level she has a possible blighted ovum pregnancy.         The history is provided by the patient. No  was used.     Review of patient's allergies indicates:  No Known Allergies  No past medical history on file.  No past surgical history on file.  No family history on file.  Social History     Tobacco Use    Smoking status: Never    Smokeless tobacco: Never   Substance Use Topics    Alcohol use: Never    Drug use: Never     Review of Systems   Constitutional:  Negative for fever.   HENT:  Negative for sore throat.    Respiratory:  Negative for shortness of breath.    Cardiovascular:  Negative for chest pain.   Gastrointestinal:  Negative for nausea.   Genitourinary:  Negative for dysuria.   Musculoskeletal:  Negative for back pain.   Skin:  Negative for rash.   Neurological:  Negative for weakness.   Hematological:  Does not bruise/bleed easily.       Physical Exam     Initial Vitals [24 1602]   BP Pulse Resp Temp SpO2   114/68 68 20 98.4 °F (36.9 °C) 100 %      MAP       --         Physical Exam    Nursing note and vitals reviewed.  Constitutional: She appears well-developed and well-nourished.   HENT:   Head: Normocephalic and atraumatic.   Eyes: Conjunctivae and EOM are normal. Pupils are  equal, round, and reactive to light.   Neck: Neck supple.   Normal range of motion.  Cardiovascular:  Normal rate and regular rhythm.           Pulmonary/Chest: Breath sounds normal. No respiratory distress.   Abdominal: Abdomen is soft. Bowel sounds are normal. She exhibits no distension. There is no abdominal tenderness.   Musculoskeletal:         General: No edema. Normal range of motion.      Cervical back: Normal range of motion and neck supple.     Neurological: She is alert and oriented to person, place, and time. She has normal strength.   Skin: Skin is warm and dry.   Psychiatric: Thought content normal.         ED Course   Procedures  Labs Reviewed   HCG, QUANTITATIVE - Abnormal; Notable for the following components:       Result Value    Beta Human Chorionic Gonadotropin Quantitative 37,914.90 (*)     All other components within normal limits   HCG, SERUM, QUALITATIVE - Abnormal; Notable for the following components:    Beta Human Chorionic Gonadotropin Qualitative Positive (*)     All other components within normal limits   EXTRA TUBES    Narrative:     The following orders were created for panel order EXTRA TUBES.  Procedure                               Abnormality         Status                     ---------                               -----------         ------                     Lavender Top Hold[[id number]]                               Final result                 Please view results for these tests on the individual orders.   LAVENDER TOP HOLD          Imaging Results    None          Medications - No data to display  Medical Decision Making  On 2024 patient presented with dysuria and pelvic pain for 4 days.  HCG and ultrasound were done.  HCG level was 48,777.91 and ultrasound showed Single intrauterine gestational sac with AUA of 6 weeks and 5 days.  No fetal pole identified at this time. No adnexal mass or free fluid.  She is  6 para 4 with 1 miscarriage.  Her last menstrual  cycle was in February 2024.  She has currently not experiencing any pain or discharge at this time.  No bleeding as well.  She is here for a repeat HCG level she has a possible blighted ovum pregnancy.     Patient hCG level dropped on labs.  Gyn was consulted.  Spoke with Tommie Mccormack MD. he looked at the ultrasound and also the lab work and stated that the patient will be followed up with on Monday to repeat the ultrasound in gyn clinic.     used and discussed care with patient.  She stated she was available any time on Monday to be seen.  I did give her ER precautions with any pain or bleeding to return to the ER.    Amount and/or Complexity of Data Reviewed  External Data Reviewed: labs, radiology and notes.     Details: 05/02/24 17:25  WBC: 7.21  RBC: 4.28  Hemoglobin: 8.4 (L)  Hematocrit: 28.5 (L)  MCV: 66.6 (L)  MCH: 19.6 (L)  MCHC: 29.5 (L)  RDW: 19.0 (H)  Platelet Count: 363  MPV: 9.0  Platelet Estimate: Normal  Neut %: 60.7  LYMPH %: 29.4  Mono %: 8.0  Eos %: 1.4  Basophil %: 0.4  Immature Granulocytes: 0.1  Neut #: 4.37  Lymph #: 2.12  Mono #: 0.58  Eos #: 0.10  Baso #: 0.03  Immature Grans (Abs): 0.01  nRBC: 0.0  Microcytosis: 1+ !  Ovalocytes: Slight !  Aniso: 1+ !  Poikilocytosis: 1+ !  Hypo: Slight !  Elliptocytosis: Slight !  RBC Morph: Abnormal !  Sodium: 140  Potassium: 4.0  Chloride: 110 (H)  CO2: 21 (L)  BUN: 6.9 (L)  Creatinine: 0.69  eGFR: >60  Glucose: 74  Calcium: 9.4  ALP: 49  PROTEIN TOTAL: 7.5  Albumin: 4.0  Albumin/Globulin Ratio: 1.1  BILIRUBIN TOTAL: 0.2  AST: 15  ALT: 14  Globulin, Total: 3.5  Beta HCG Quant: 48,777.91 (H)      (L): Data is abnormally low  (H): Data is abnormally high  !: Data is abnormal    EXAMINATION:  US OB <14 WEEKS, TRANSABDOM & TRANSVAG, SINGLE GESTATION (XPD)     CLINICAL HISTORY:  abdominal pain, pregnancy;     TECHNIQUE:  Ob ultrasound     COMPARISON:  Ultrasound dated 07/14/2023     FINDINGS:  The uterus measures 8.1 x 5.7 x 5.6 cm in size.  A  single intrauterine gestational sac is identified.  The mean sac diameter measures 1.8 cm corresponding to a gestational age of 6 weeks and 5 days.  There is no fetal pole or yolk sac identified at this time.     The right ovary is not well visualized.  Left ovary measures 2.9 x 2.4 x 2.4 cm in size and contains vascular flow.  There is no adnexal mass or free fluid.     Impression:     1. Single intrauterine gestational sac with AUA of 6 weeks and 5 days.  No fetal pole identified at this time.  2. No adnexal mass or free fluid.        Electronically signed by:Kym Montgomery  Date:                                            05/02/2024  Time:                                           19:20    Labs: ordered. Decision-making details documented in ED Course.      Additional MDM:   Differential Diagnosis:   Blighted ovum, molar pregnancy             ED Course as of 05/06/24 1903   Mon May 06, 2024   1808 HCG, Serum, Qualitative(!) [LS]      ED Course User Index  [LS] Nellie Slade FNP                     It is important that you follow up with your primary care provider or specialist if indicated for further evaluation, workup, and treatment as necessary. The exam and treatment you received in Emergency Department was for an urgent problem and NOT INTENDED AS COMPLETE CARE. It is important that you FOLLOW UP with a doctor for ongoing care. If your symptoms become WORSE or you DO NOT IMPROVE and you are unable to reach your health care provider, you should RETURN to the Emergency Department        Clinical Impression:  Final diagnoses:  [Z3A.01] Less than 8 weeks gestation of pregnancy (Primary)                 Nellie Slade FNP  05/06/24 1903

## 2024-05-07 ENCOUNTER — TELEPHONE (OUTPATIENT)
Dept: GYNECOLOGY | Facility: CLINIC | Age: 34
End: 2024-05-07

## 2024-05-07 NOTE — TELEPHONE ENCOUNTER
Called patient to inform about her appointment on 5/15/24 at 8:45 am. Patient did not answer. LVM.

## 2024-05-07 NOTE — TELEPHONE ENCOUNTER
Good morning,    Monday, 5/13 is way overbooked. There was a morning opening on 5/15/24 at 8:45 am. I put patient in that slot.     Clinical staff, please call patient to make her aware of date and time of the appointment. Thank you!         ----- Message from Marlyn Latham RN sent at 5/7/2024  7:30 AM CDT -----  Regarding: FW: ED follow up for suspected early pregnancy loss  ER f/u appt needed  ----- Message -----  From: Tommie Mart MD  Sent: 5/6/2024   6:55 PM CDT  To: Alicia Velazquez MD; #  Subject: ED follow up for suspected early pregnancy l#    Good evening,    Please schedule this patient for appointment with GYN residents on Monday 5/13 for in office ultrasound. Patient with suspected early pregnancy loss from ED visit on 5/2, needs close follow up.    Thank you,    Tommie Mart MD  [facility], PGY-2

## 2024-05-09 ENCOUNTER — HOSPITAL ENCOUNTER (EMERGENCY)
Facility: HOSPITAL | Age: 34
Discharge: HOME | End: 2024-05-09
Attending: EMERGENCY MEDICINE

## 2024-05-09 VITALS
RESPIRATION RATE: 16 BRPM | SYSTOLIC BLOOD PRESSURE: 110 MMHG | WEIGHT: 141.13 LBS | BODY MASS INDEX: 24.22 KG/M2 | OXYGEN SATURATION: 100 % | HEART RATE: 63 BPM | DIASTOLIC BLOOD PRESSURE: 73 MMHG | TEMPERATURE: 97 F

## 2024-05-09 DIAGNOSIS — O03.9 SPONTANEOUS ABORTION: Primary | ICD-10-CM

## 2024-05-09 LAB
ALBUMIN SERPL-MCNC: 4.1 G/DL (ref 3.5–5)
ALBUMIN/GLOB SERPL: 1.1 RATIO (ref 1.1–2)
ALP SERPL-CCNC: 50 UNIT/L (ref 40–150)
ALT SERPL-CCNC: 11 UNIT/L (ref 0–55)
AST SERPL-CCNC: 13 UNIT/L (ref 5–34)
B-HCG FREE SERPL-ACNC: ABNORMAL MIU/ML
BASOPHILS # BLD AUTO: 0.03 X10(3)/MCL
BASOPHILS NFR BLD AUTO: 0.5 %
BILIRUB SERPL-MCNC: 0.2 MG/DL
BUN SERPL-MCNC: 4.2 MG/DL (ref 7–18.7)
CALCIUM SERPL-MCNC: 10 MG/DL (ref 8.4–10.2)
CHLORIDE SERPL-SCNC: 107 MMOL/L (ref 98–107)
CO2 SERPL-SCNC: 24 MMOL/L (ref 22–29)
CREAT SERPL-MCNC: 0.7 MG/DL (ref 0.55–1.02)
EOSINOPHIL # BLD AUTO: 0.16 X10(3)/MCL (ref 0–0.9)
EOSINOPHIL NFR BLD AUTO: 2.5 %
ERYTHROCYTE [DISTWIDTH] IN BLOOD BY AUTOMATED COUNT: 18.7 % (ref 11.5–17)
GFR SERPLBLD CREATININE-BSD FMLA CKD-EPI: >60 ML/MIN/1.73/M2
GLOBULIN SER-MCNC: 3.8 GM/DL (ref 2.4–3.5)
GLUCOSE SERPL-MCNC: 83 MG/DL (ref 74–100)
HCT VFR BLD AUTO: 29.9 % (ref 37–47)
HGB BLD-MCNC: 8.9 G/DL (ref 12–16)
HOLD SPECIMEN: NORMAL
IMM GRANULOCYTES # BLD AUTO: 0.01 X10(3)/MCL (ref 0–0.04)
IMM GRANULOCYTES NFR BLD AUTO: 0.2 %
LYMPHOCYTES # BLD AUTO: 1.71 X10(3)/MCL (ref 0.6–4.6)
LYMPHOCYTES NFR BLD AUTO: 26.8 %
MCH RBC QN AUTO: 19.8 PG (ref 27–31)
MCHC RBC AUTO-ENTMCNC: 29.8 G/DL (ref 33–36)
MCV RBC AUTO: 66.4 FL (ref 80–94)
MONOCYTES # BLD AUTO: 0.46 X10(3)/MCL (ref 0.1–1.3)
MONOCYTES NFR BLD AUTO: 7.2 %
NEUTROPHILS # BLD AUTO: 4.02 X10(3)/MCL (ref 2.1–9.2)
NEUTROPHILS NFR BLD AUTO: 62.8 %
NRBC BLD AUTO-RTO: 0 %
PLATELET # BLD AUTO: 375 X10(3)/MCL (ref 130–400)
PMV BLD AUTO: 9.4 FL (ref 7.4–10.4)
POTASSIUM SERPL-SCNC: 3.7 MMOL/L (ref 3.5–5.1)
PROT SERPL-MCNC: 7.9 GM/DL (ref 6.4–8.3)
RBC # BLD AUTO: 4.5 X10(6)/MCL (ref 4.2–5.4)
SODIUM SERPL-SCNC: 141 MMOL/L (ref 136–145)
WBC # SPEC AUTO: 6.39 X10(3)/MCL (ref 4.5–11.5)

## 2024-05-09 PROCEDURE — 80053 COMPREHEN METABOLIC PANEL: CPT | Performed by: PHYSICIAN ASSISTANT

## 2024-05-09 PROCEDURE — 85025 COMPLETE CBC W/AUTO DIFF WBC: CPT | Performed by: PHYSICIAN ASSISTANT

## 2024-05-09 PROCEDURE — 84702 CHORIONIC GONADOTROPIN TEST: CPT | Performed by: PHYSICIAN ASSISTANT

## 2024-05-09 PROCEDURE — 99284 EMERGENCY DEPT VISIT MOD MDM: CPT

## 2024-05-09 NOTE — ED PROVIDER NOTES
"Encounter Date: 2024       History     Chief Complaint   Patient presents with    Threatened Miscarriage     8 WK OB W CO VAG BLEEDING W CLOTS AND ABD CRAMPING SINCE LAST PM.       Tiff Carrero is a 33 y.o. female  approximately 8 weeks gestation by LMP returns to the ED with vaginal bleeding and cramping. She reports she was seen in the ED 6 days ago and found out she was pregnant. She states she was told there was "something wrong with the ultrasound" at that time. She returned to the ED on 24 and her hcg was down trending. GYN was consulted at that visit and she has an appt on 24 for repeat ultrasound. She began bleeding and cramping last night.       The history is provided by the patient. No  was used.     Review of patient's allergies indicates:  No Known Allergies  No past medical history on file.  No past surgical history on file.  No family history on file.  Social History     Tobacco Use    Smoking status: Never    Smokeless tobacco: Never   Substance Use Topics    Alcohol use: Never    Drug use: Never     Review of Systems   Constitutional:  Negative for chills, fatigue and fever.   HENT:  Negative for congestion, ear pain, sinus pain and sore throat.    Eyes:  Negative for pain.   Respiratory:  Negative for cough, chest tightness and shortness of breath.    Cardiovascular:  Negative for chest pain.   Gastrointestinal:  Positive for abdominal pain. Negative for constipation, diarrhea, nausea and vomiting.   Genitourinary:  Positive for vaginal bleeding. Negative for dysuria, pelvic pain, vaginal discharge and vaginal pain.   Musculoskeletal:  Negative for back pain and joint swelling.   Skin:  Negative for color change and rash.   Neurological:  Negative for dizziness and weakness.   Psychiatric/Behavioral:  Negative for behavioral problems and confusion.        Physical Exam     Initial Vitals [24 1129]   BP Pulse Resp Temp SpO2   118/68 66 16 97.2 " °F (36.2 °C) 100 %      MAP       --         Physical Exam    Constitutional: She appears well-developed and well-nourished.   HENT:   Head: Normocephalic and atraumatic.   Nose: Nose normal.   Eyes: EOM are normal. Pupils are equal, round, and reactive to light.   Neck: Neck supple. No thyromegaly present. No JVD present.   Normal range of motion.  Cardiovascular:  Normal rate, regular rhythm, normal heart sounds and intact distal pulses.           No murmur heard.  Pulmonary/Chest: Breath sounds normal. No respiratory distress. She has no wheezes. She has no rhonchi. She has no rales. She exhibits no tenderness.   Abdominal: Abdomen is soft. Bowel sounds are normal. She exhibits no distension. There is no abdominal tenderness. There is no rebound and no guarding.   Musculoskeletal:         General: No tenderness or edema. Normal range of motion.      Cervical back: Normal range of motion and neck supple.     Lymphadenopathy:     She has no cervical adenopathy.   Neurological: She is alert and oriented to person, place, and time.   Skin: Skin is warm and dry. Capillary refill takes less than 2 seconds.   Psychiatric: She has a normal mood and affect. Thought content normal.         ED Course   Procedures  Labs Reviewed   HCG, QUANTITATIVE - Abnormal; Notable for the following components:       Result Value    Beta HCG Quant 27,598.44 (*)     All other components within normal limits   COMPREHENSIVE METABOLIC PANEL - Abnormal; Notable for the following components:    Blood Urea Nitrogen 4.2 (*)     Globulin 3.8 (*)     All other components within normal limits   CBC WITH DIFFERENTIAL - Abnormal; Notable for the following components:    Hgb 8.9 (*)     Hct 29.9 (*)     MCV 66.4 (*)     MCH 19.8 (*)     MCHC 29.8 (*)     RDW 18.7 (*)     All other components within normal limits   CBC W/ AUTO DIFFERENTIAL    Narrative:     The following orders were created for panel order CBC auto differential.  Procedure               "                 Abnormality         Status                     ---------                               -----------         ------                     CBC with Differential[[id number]]       Abnormal            Final result                 Please view results for these tests on the individual orders.   EXTRA TUBES    Narrative:     The following orders were created for panel order EXTRA TUBES.  Procedure                               Abnormality         Status                     ---------                               -----------         ------                     Light Blue Top Hold[[id number]]                             In process                 Gold Top Hold[[id number]]                                   In process                 Pink Top Hold[[id number]]                                   In process                   Please view results for these tests on the individual orders.   LIGHT BLUE TOP HOLD   GOLD TOP HOLD   PINK TOP HOLD          Imaging Results    None          Medications - No data to display  Medical Decision Making  DDX: threated ab, missed ab, normal pregnancy, blighted ovum, among others    Tiff Carrero is a 33 y.o. female  approximately 8 weeks gestation by LMP returns to the ED with vaginal bleeding and cramping. She reports she was seen in the ED 6 days ago and found out she was pregnant. She states she was told there was "something wrong with the ultrasound" at that time. She returned to the ED on 24 and her hcg was down trending. GYN was consulted at that visit and she has an appt on 24 for repeat ultrasound. She began bleeding and cramping last night.     On initial visit, US with Single intrauterine gestational sac with AUA of 6 weeks and 5 days.  No fetal pole identified at this time. Hcg was 48,777 at this visit. She returned 4 days later, and hcg was downtrending, it was now 37,914. She began bleeding and cramping last night, hcg today is 27,598. These lab " values and symptoms are consistent with a miscarriage. There is no need for ultrasound today, she has one scheduled on Monday in OB clinic. Her hemoglobin has remained low but stable (8.9, up from 8.4 3 days ago). Will DC home in stable condition and instructed her to keep her scheduled appt in Gyn clinic on Monday. Strict return precautions given. Stable for discharge.     Amount and/or Complexity of Data Reviewed  Labs: ordered.                                      Clinical Impression:  Final diagnoses:  [O03.9] Spontaneous  (Primary)          ED Disposition Condition    Discharge Stable          ED Prescriptions    None       Follow-up Information       Follow up With Specialties Details Why Contact Info    [facility]  In 1 week  [address] [place] [address]    [facility] - Emergency Dept Emergency Medicine In 3 days As needed, If symptoms worsen [address] [place] [address] [phone]             Mila Lang PA-C  24 1425

## 2024-05-09 NOTE — Clinical Note
"Tiff Ho" Carrie Carrero was seen and treated in our emergency department on 5/9/2024.  She may return to work on 05/14/2024.       If you have any questions or concerns, please don't hesitate to call.      Mila Lang PA-C"

## 2024-05-09 NOTE — DISCHARGE INSTRUCTIONS
Please keep your scheduled appt on Monday in GYN clinic.     It is important that you follow up with your primary care provider or specialist if indicated for further evaluation, workup, and treatment as necessary. The exam and treatment you received in Emergency Department was for an urgent problem and NOT INTENDED AS COMPLETE CARE. It is important that you FOLLOW UP with a doctor for ongoing care. If your symptoms become WORSE or you DO NOT IMPROVE and you are unable to reach your health care provider, you should RETURN to the Emergency Department.

## 2024-05-15 ENCOUNTER — OFFICE VISIT (OUTPATIENT)
Dept: GYNECOLOGY | Facility: CLINIC | Age: 34
End: 2024-05-15

## 2024-05-15 ENCOUNTER — LAB VISIT (OUTPATIENT)
Dept: LAB | Facility: HOSPITAL | Age: 34
End: 2024-05-15

## 2024-05-15 VITALS
HEIGHT: 63 IN | SYSTOLIC BLOOD PRESSURE: 101 MMHG | BODY MASS INDEX: 24.98 KG/M2 | RESPIRATION RATE: 20 BRPM | DIASTOLIC BLOOD PRESSURE: 62 MMHG | WEIGHT: 141 LBS | TEMPERATURE: 98 F | OXYGEN SATURATION: 99 % | HEART RATE: 67 BPM

## 2024-05-15 DIAGNOSIS — D64.9 ANEMIA, UNSPECIFIED TYPE: ICD-10-CM

## 2024-05-15 DIAGNOSIS — O02.1 MISSED ABORTION: Primary | ICD-10-CM

## 2024-05-15 DIAGNOSIS — O03.4 INCOMPLETE ABORTION: ICD-10-CM

## 2024-05-15 LAB
ANISOCYTOSIS BLD QL SMEAR: ABNORMAL
B-HCG FREE SERPL-ACNC: ABNORMAL MIU/ML
ERYTHROCYTE [DISTWIDTH] IN BLOOD BY AUTOMATED COUNT: 18.8 % (ref 11.5–17)
GROUP & RH: NORMAL
HCT VFR BLD AUTO: 30.4 % (ref 37–47)
HGB BLD-MCNC: 9 G/DL (ref 12–16)
HYPOCHROMIA BLD QL SMEAR: ABNORMAL
INDIRECT COOMBS: NORMAL
MCH RBC QN AUTO: 19.7 PG (ref 27–31)
MCHC RBC AUTO-ENTMCNC: 29.6 G/DL (ref 33–36)
MCV RBC AUTO: 66.4 FL (ref 80–94)
MICROCYTES BLD QL SMEAR: ABNORMAL
NRBC BLD AUTO-RTO: 0 %
PLATELET # BLD AUTO: 351 X10(3)/MCL (ref 130–400)
PLATELET # BLD EST: NORMAL 10*3/UL
PMV BLD AUTO: 9.5 FL (ref 7.4–10.4)
POIKILOCYTOSIS BLD QL SMEAR: SLIGHT
RBC # BLD AUTO: 4.58 X10(6)/MCL (ref 4.2–5.4)
RBC MORPH BLD: ABNORMAL
SPECIMEN OUTDATE: NORMAL
WBC # SPEC AUTO: 4.7 X10(3)/MCL (ref 4.5–11.5)

## 2024-05-15 PROCEDURE — 86901 BLOOD TYPING SEROLOGIC RH(D): CPT

## 2024-05-15 PROCEDURE — 84702 CHORIONIC GONADOTROPIN TEST: CPT

## 2024-05-15 PROCEDURE — 99215 OFFICE O/P EST HI 40 MIN: CPT | Mod: PBBFAC,25

## 2024-05-15 PROCEDURE — 36415 COLL VENOUS BLD VENIPUNCTURE: CPT

## 2024-05-15 PROCEDURE — 85027 COMPLETE CBC AUTOMATED: CPT

## 2024-05-15 RX ORDER — FERROUS SULFATE 325(65) MG
325 TABLET ORAL
Qty: 30 TABLET | Refills: 0 | Status: SHIPPED | OUTPATIENT
Start: 2024-05-15

## 2024-05-15 NOTE — ASSESSMENT & PLAN NOTE
Most recent H/H: 8.9/29.2.  Repeat CBC ordered today.    Iron supplements prescribed in preparation for scheduled suction D&C.

## 2024-05-15 NOTE — H&P (VIEW-ONLY)
[facility] Gynecology Preoperative Visit History and Physical    HPI:   Tiff Carrero is a 33 y.o.  presenting to GYN clinic for follow up in setting of incomplete .     Patient initially presented to the ED on May 2nd with abdominal pain and dysuria.  Patient found out at that ED visit that she was pregnant.  LMP February making her approximately 8 weeks pregnant.  Transvaginal ultrasound revealed single intrauterine gestational sac approximately 6 weeks and 5 days no fetal pole or yolk sac was identified.  Beta HCG 48,777. Patient counseled to return in 72 hours later for repeat beta.  Patient returned  and beta decreased to 37,914. Patient instructed to return to gyn clinic on  for repeat ultrasound. Patient re-presented to ED on May 9th with vaginal bleeding and abdominal cramping.  Beta HCG 27,598.     Today patient presents for repeat ultrasound in the setting of incomplete .  Reports she is no longer bleeding and no longer in pain.  Patient states she has passed blood clots does not report passing any tissue.     PMH: None    ObHx:   x4  SAB x1 (second trimester loss)    GynHx:  LMP 2024.     SurgHx:  Denies.    FamHx:  Denies history of breast, ovarian, endometrial, colon cancers.    SocialHx:  Denies tobacco/alcohol/illicit drug use.    All:  NKDA    Meds: None      OB History    Para Term  AB Living   6 4 4   1 4   SAB IAB Ectopic Multiple Live Births                  # Outcome Date GA Lbr Myke/2nd Weight Sex Type Anes PTL Lv   6 Current            5 Term            4 Term            3 Term            2 Term            1 AB              History reviewed. No pertinent past medical history.  History reviewed. No pertinent surgical history.  Prior to Admission medications    Medication Sig Start Date End Date Taking? Authorizing Provider   ferrous sulfate (FEOSOL) 325 mg (65 mg iron) Tab tablet Take 1 tablet (325 mg total) by mouth daily with  "breakfast. 5/15/24   Odalis Rodriguez MD     Review of patient's allergies indicates:  No Known Allergies  Social History     Socioeconomic History    Marital status:    Tobacco Use    Smoking status: Never    Smokeless tobacco: Never   Substance and Sexual Activity    Alcohol use: Never    Drug use: Never     No family history on file.    Review of Systems: As stated in HPI.      Objective:     Vitals:    05/15/24 1053   BP: 101/62   BP Location: Left arm   Patient Position: Sitting   BP Method: Medium (Automatic)   Pulse: 67   Resp: 20   Temp: 97.5 °F (36.4 °C)   SpO2: 99%   Weight: 64 kg (141 lb)   Height: 5' 3" (1.6 m)     Body mass index is 24.98 kg/m².    PHYSICAL EXAM  Physical Exam:   General: Alert and oriented, in no acute distress  Lungs: Breathing comfortably on room air, no conversational dyspnea  Heart: Regular rate, extremities well perfused  Abdomen: Soft, non-distended, non tender to palpation, no involuntary guarding, no rebound tenderness  Extremities: Atraumatic, non-edematous, no cords or calf tenderness, no significant calf/ankle edema  External genitalia: Normal female genitalia without lesion, discharge or tenderness. Normal appearing urethral meatus. Normal appearing external anus.  Bimanual Exam: Uterus 8-10 cm in size, mobile, non-tender, anteverted, freely mobile, smooth in contour, no masses. No cervical motion tenderness. No adnexal fullness/tenderness.  Note:  Female nurse chaperone present for entirety of exam.    LABS:  Lab Results   Component Value Date    WBC 6.39 2024    HGB 8.9 (L) 2024    HCT 29.9 (L) 2024    MCV 66.4 (L) 2024     2024       IMAGING:            Assessment:      33 y.o.  with missed  based on down-trending beta hCG and transvaginal ultrasound performed in clinic with irregular gestational sac, no fetal pole, and no FHT. Patient counseled that this pregnancy will not continue and will require surgical " management via suction D&C. Alternatives discussed with patient however, given that patient has stopped bleeding and is no longer experiencing pain expectant management is likely not an option.  Medication management also not recommended given concern for worsening symptomatic anemia requiring hospitalization, blood transfusion, and ultimately surgery.  Recommendation to proceed with suction D&C discussed with patient.  Patient counseled on r/b/a and has agreed to proceed.    1. Missed   HCG, Quantitative    CBC Without Differential    Type & Screen    Case Request Operating Room: DILATION AND CURETTAGE, UTERUS, USING SUCTION      2. Anemia, unspecified type  ferrous sulfate (FEOSOL) 325 mg (65 mg iron) Tab tablet          Plan:     Counseling: Alternatives to this planned procedure were explained to the patient including expectant, medical and other types of surgical management.    Alternatives to this planned procedure were explained to the patient including expectant management or medication management. This procedure and its risks, reasons, benefits and complications (including perforation, subsequent injury to bowel, air/gas embolism, fluid overload, major blood vessel,hemorrhage, possibility of transfusion, infection, scarring, dyspareunia, further surgery, failure of the procedure) were reviewed in detail. Complication rate less than 1% overall.   We have reviewed the typical perioperative course with hospital stay, and post-operative precautions and restrictions have been reviewed.    Transfusion of blood and blood products discussed. Patient was consented for blood transfusion in the case of an emergency.  She understands the possibility of blood transfusion reaction and the attendant risk of transmission of HIV & Hepatitis C to be 1 in 2 million and the risk of Hepatitis B to be 1 in 200,000.  She is aware of the possibility of transfusion reaction. All questions were answered.   Patient  understands we are affiliated with a teaching institution and residents and medical students will be involved in her care.  She has consented to an exam under anesthesia and understands that medical students participate in this portion of the procedure.  All questions were answered.    Preop testing ordered.  Surgery case scheduled: Tuesday May 21. Surgical consents signed.  Instructions reviewed, including NPO after midnight.   Counseled to hold the following medications on the day of surgery: per anesthesia   Current contraception: None    To OR for suction D&C with Dr. Henry.   Scheduled on Tuesday May 21.    Discussed with Dr. Sauer and Dr. Henry.    Odalis Rodriguez MD  [facility] - PGY-2  5:44 PM 05/15/2024

## 2024-05-15 NOTE — PROGRESS NOTES
"[facility] GYNECOLOGY CLINIC NOTE     Tiff Carrero is a 33 y.o.  presenting to GYN clinic for follow up in setting of incomplete .     Patient initially presented to the ED on May 2nd with abdominal pain and dysuria.  Patient found out at that ED visit that she was pregnant.  LMP February making her approximately 8 weeks pregnant.  Transvaginal ultrasound revealed single intrauterine gestational sac approximately 6 weeks and 5 days no fetal pole or yolk sac was identified.  Beta HCG 48,777. Patient counseled to return in 72 hours later for repeat beta.  Patient returned  and beta decreased to 37,914. Patient instructed to return to gyn clinic on  for repeat ultrasound. Patient re-presented to ED on May 9th with vaginal bleeding and abdominal cramping.  Beta HCG 27,598.     Today patient presents for repeat ultrasound in the setting of incomplete .  Reports she is no longer bleeding and no longer in pain.  Patient states she has passed blood clots does not report passing any tissue.    Gynecology  OB History          7    Para   4    Term                AB   2    Living             SAB        IAB        Ectopic        Multiple        Live Births                    History reviewed. No pertinent past medical history.   History reviewed. No pertinent surgical history.   No current outpatient medications  Social History     Tobacco Use    Smoking status: Never    Smokeless tobacco: Never   Substance Use Topics    Alcohol use: Never    Drug use: Never       Review of Systems  Pertinent items noted in HPI.    Objective:     Vitals:    05/15/24 1053   BP: 101/62   BP Location: Left arm   Patient Position: Sitting   BP Method: Medium (Automatic)   Pulse: 67   Resp: 20   Temp: 97.5 °F (36.4 °C)   SpO2: 99%   Weight: 64 kg (141 lb)   Height: 5' 3" (1.6 m)     Body mass index is 24.98 kg/m².    Physical Exam:   General: Alert and oriented, in no acute distress  Lungs: " Breathing comfortably on room air, no conversational dyspnea  Heart: Regular rate, extremities well perfused  Abdomen: Soft, non-distended, non tender to palpation, no involuntary guarding, no rebound tenderness  Extremities: Atraumatic, non-edematous, no cords or calf tenderness, no significant calf/ankle edema  External genitalia: Normal female genitalia without lesion, discharge or tenderness. Normal appearing urethral meatus. Normal appearing external anus.  Bimanual Exam: Uterus 8-10 cm in size, mobile, non-tender, anteverted, freely mobile, smooth in contour, no masses. No cervical motion tenderness. No adnexal fullness/tenderness.  Note:  Female nurse chaperone present for entirety of exam.    Relevant Labs:   Lab Results   Component Value Date    WBC 6.39 2024    HGB 8.9 (L) 2024    HCT 29.9 (L) 2024    MCV 66.4 (L) 2024     2024         Relevant Imaging:              Assessment:     33 y.o.  here for repeat ultrasound for concern of incomplete .  1. Incomplete   HCG, Quantitative    CBC Without Differential    Type & Screen      2. Missed   Case Request Operating Room: DILATION AND CURETTAGE, UTERUS, USING SUCTION      3. Anemia, unspecified type  ferrous sulfate (FEOSOL) 325 mg (65 mg iron) Tab tablet             Plan:         Problem List Items Addressed This Visit    None      Return to clinic ***    Discussed patient and plan with ***    Odalis Rodriguez MD  [facility] - PGY-2  11:59 AM 05/15/2024

## 2024-05-15 NOTE — ASSESSMENT & PLAN NOTE
Patient with initial concern of incomplete  given vaginal bleeding and signs of a non progressing pregnancy on ultrasound.  Patient no longer experiencing vaginal bleeding or pain.    Ultrasound today reveals irregular gestational sac no fetal pole or yolk sac present.  No fetal heart tones present.  Patient counseled this pregnancy will not continue.  Discussed management options with patient including expectant management, medical management, and surgical management.  Given that patient has stopped bleeding and is no longer experiencing pain expectant management is likely not an option.  Medication management also not recommended given concern for worsening symptomatic anemia requiring hospitalization, blood transfusion, and ultimately surgery.  Recommendation to proceed with suction D&C discussed with patient.  Patient counseled on r/b/a and has agreed to proceed with suction D&C to be performed on Tuesday May 21. Consents signed with patient and all questions answered.

## 2024-05-16 ENCOUNTER — TELEPHONE (OUTPATIENT)
Dept: GYNECOLOGY | Facility: CLINIC | Age: 34
End: 2024-05-16

## 2024-05-16 NOTE — TELEPHONE ENCOUNTER
Patient calling to request more information about her surgery. Patient stated that she received a call from our number today, but she was working at that time and she was unable to answer it. Please advise. Thanks!

## 2024-05-17 NOTE — TELEPHONE ENCOUNTER
Returned patient's phone call and informed her that she will receive a phone call on the day prior to surgery to instruct her when to arrive to the hospital. All questions answered.     Poly Garner MD  [facility], PGY-3

## 2024-05-19 ENCOUNTER — HOSPITAL ENCOUNTER (EMERGENCY)
Facility: HOSPITAL | Age: 34
Discharge: HOME | End: 2024-05-20
Attending: STUDENT IN AN ORGANIZED HEALTH CARE EDUCATION/TRAINING PROGRAM

## 2024-05-19 DIAGNOSIS — O03.4 INCOMPLETE MISCARRIAGE: Primary | ICD-10-CM

## 2024-05-19 DIAGNOSIS — R10.2 PELVIC PAIN: ICD-10-CM

## 2024-05-19 LAB
ALBUMIN SERPL-MCNC: 3.9 G/DL (ref 3.5–5)
ALBUMIN/GLOB SERPL: 1.1 RATIO (ref 1.1–2)
ALP SERPL-CCNC: 57 UNIT/L (ref 40–150)
ALT SERPL-CCNC: 14 UNIT/L (ref 0–55)
ANION GAP SERPL CALC-SCNC: 11 MEQ/L
AST SERPL-CCNC: 15 UNIT/L (ref 5–34)
BASOPHILS # BLD AUTO: 0.03 X10(3)/MCL
BASOPHILS NFR BLD AUTO: 0.4 %
BILIRUB SERPL-MCNC: 0.2 MG/DL
BUN SERPL-MCNC: 6.1 MG/DL (ref 7–18.7)
CALCIUM SERPL-MCNC: 9.6 MG/DL (ref 8.4–10.2)
CHLORIDE SERPL-SCNC: 108 MMOL/L (ref 98–107)
CO2 SERPL-SCNC: 23 MMOL/L (ref 22–29)
CREAT SERPL-MCNC: 0.66 MG/DL (ref 0.55–1.02)
CREAT/UREA NIT SERPL: 9
EOSINOPHIL # BLD AUTO: 0.25 X10(3)/MCL (ref 0–0.9)
EOSINOPHIL NFR BLD AUTO: 3.5 %
ERYTHROCYTE [DISTWIDTH] IN BLOOD BY AUTOMATED COUNT: 19.3 % (ref 11.5–17)
GFR SERPLBLD CREATININE-BSD FMLA CKD-EPI: >60 ML/MIN/1.73/M2
GLOBULIN SER-MCNC: 3.6 GM/DL (ref 2.4–3.5)
GLUCOSE SERPL-MCNC: 86 MG/DL (ref 74–100)
HCT VFR BLD AUTO: 28.3 % (ref 37–47)
HGB BLD-MCNC: 8.4 G/DL (ref 12–16)
IMM GRANULOCYTES # BLD AUTO: 0.03 X10(3)/MCL (ref 0–0.04)
IMM GRANULOCYTES NFR BLD AUTO: 0.4 %
LYMPHOCYTES # BLD AUTO: 2.92 X10(3)/MCL (ref 0.6–4.6)
LYMPHOCYTES NFR BLD AUTO: 40.3 %
MAGNESIUM SERPL-MCNC: 2 MG/DL (ref 1.6–2.6)
MCH RBC QN AUTO: 19.9 PG (ref 27–31)
MCHC RBC AUTO-ENTMCNC: 29.7 G/DL (ref 33–36)
MCV RBC AUTO: 67.1 FL (ref 80–94)
MONOCYTES # BLD AUTO: 0.38 X10(3)/MCL (ref 0.1–1.3)
MONOCYTES NFR BLD AUTO: 5.2 %
NEUTROPHILS # BLD AUTO: 3.63 X10(3)/MCL (ref 2.1–9.2)
NEUTROPHILS NFR BLD AUTO: 50.2 %
NRBC BLD AUTO-RTO: 0 %
PLATELET # BLD AUTO: 331 X10(3)/MCL (ref 130–400)
PMV BLD AUTO: 9 FL (ref 7.4–10.4)
POTASSIUM SERPL-SCNC: 3.9 MMOL/L (ref 3.5–5.1)
PROT SERPL-MCNC: 7.5 GM/DL (ref 6.4–8.3)
RBC # BLD AUTO: 4.22 X10(6)/MCL (ref 4.2–5.4)
SODIUM SERPL-SCNC: 142 MMOL/L (ref 136–145)
WBC # SPEC AUTO: 7.24 X10(3)/MCL (ref 4.5–11.5)

## 2024-05-19 PROCEDURE — 85025 COMPLETE CBC W/AUTO DIFF WBC: CPT | Performed by: STUDENT IN AN ORGANIZED HEALTH CARE EDUCATION/TRAINING PROGRAM

## 2024-05-19 PROCEDURE — 99284 EMERGENCY DEPT VISIT MOD MDM: CPT

## 2024-05-19 PROCEDURE — 80053 COMPREHEN METABOLIC PANEL: CPT | Performed by: STUDENT IN AN ORGANIZED HEALTH CARE EDUCATION/TRAINING PROGRAM

## 2024-05-19 PROCEDURE — 83735 ASSAY OF MAGNESIUM: CPT | Performed by: STUDENT IN AN ORGANIZED HEALTH CARE EDUCATION/TRAINING PROGRAM

## 2024-05-19 RX ORDER — KETOROLAC TROMETHAMINE 30 MG/ML
30 INJECTION, SOLUTION INTRAMUSCULAR; INTRAVENOUS
Status: COMPLETED | OUTPATIENT
Start: 2024-05-20 | End: 2024-05-20

## 2024-05-19 RX ORDER — GABAPENTIN 300 MG/1
300 CAPSULE ORAL
Status: CANCELLED | OUTPATIENT
Start: 2024-05-19 | End: 2024-05-19

## 2024-05-19 RX ORDER — ACETAMINOPHEN 500 MG
1000 TABLET ORAL
Status: CANCELLED | OUTPATIENT
Start: 2024-05-19

## 2024-05-20 VITALS
HEIGHT: 63 IN | TEMPERATURE: 98 F | RESPIRATION RATE: 18 BRPM | BODY MASS INDEX: 25.08 KG/M2 | OXYGEN SATURATION: 100 % | HEART RATE: 63 BPM | SYSTOLIC BLOOD PRESSURE: 123 MMHG | DIASTOLIC BLOOD PRESSURE: 86 MMHG | WEIGHT: 141.56 LBS

## 2024-05-20 LAB
ANISOCYTOSIS BLD QL SMEAR: ABNORMAL
HYPOCHROMIA BLD QL SMEAR: ABNORMAL
MACROCYTES BLD QL SMEAR: SLIGHT
MICROCYTES BLD QL SMEAR: ABNORMAL
OVALOCYTES (OLG): ABNORMAL
PLATELET # BLD EST: ADEQUATE 10*3/UL
POIKILOCYTOSIS BLD QL SMEAR: ABNORMAL
POLYCHROMASIA BLD QL SMEAR: SLIGHT
RBC MORPH BLD: ABNORMAL
SCHISTOCYTE (OLG): SLIGHT
TEAR DROP CELL (OLG): SLIGHT

## 2024-05-20 PROCEDURE — 63600175 PHARM REV CODE 636 W HCPCS: Performed by: STUDENT IN AN ORGANIZED HEALTH CARE EDUCATION/TRAINING PROGRAM

## 2024-05-20 PROCEDURE — 96372 THER/PROPH/DIAG INJ SC/IM: CPT | Performed by: STUDENT IN AN ORGANIZED HEALTH CARE EDUCATION/TRAINING PROGRAM

## 2024-05-20 RX ORDER — HYDROCODONE BITARTRATE AND ACETAMINOPHEN 5; 325 MG/1; MG/1
1 TABLET ORAL EVERY 6 HOURS PRN
Qty: 12 TABLET | Refills: 0 | Status: ON HOLD | OUTPATIENT
Start: 2024-05-20 | End: 2024-05-21 | Stop reason: HOSPADM

## 2024-05-20 RX ORDER — HYDROMORPHONE HYDROCHLORIDE 1 MG/ML
0.5 INJECTION, SOLUTION INTRAMUSCULAR; INTRAVENOUS; SUBCUTANEOUS EVERY 5 MIN PRN
Status: CANCELLED | OUTPATIENT
Start: 2024-05-20

## 2024-05-20 RX ORDER — DIPHENHYDRAMINE HYDROCHLORIDE 50 MG/ML
25 INJECTION INTRAMUSCULAR; INTRAVENOUS ONCE AS NEEDED
Status: CANCELLED | OUTPATIENT
Start: 2024-05-20 | End: 2035-10-17

## 2024-05-20 RX ORDER — HYDROMORPHONE HYDROCHLORIDE 1 MG/ML
0.2 INJECTION, SOLUTION INTRAMUSCULAR; INTRAVENOUS; SUBCUTANEOUS EVERY 5 MIN PRN
Status: CANCELLED | OUTPATIENT
Start: 2024-05-20

## 2024-05-20 RX ORDER — PROCHLORPERAZINE EDISYLATE 5 MG/ML
5 INJECTION INTRAMUSCULAR; INTRAVENOUS ONCE AS NEEDED
Status: CANCELLED | OUTPATIENT
Start: 2024-05-20 | End: 2035-10-17

## 2024-05-20 RX ORDER — ONDANSETRON HYDROCHLORIDE 2 MG/ML
4 INJECTION, SOLUTION INTRAVENOUS ONCE
Status: CANCELLED | OUTPATIENT
Start: 2024-05-20 | End: 2024-05-20

## 2024-05-20 RX ORDER — IPRATROPIUM BROMIDE AND ALBUTEROL SULFATE 2.5; .5 MG/3ML; MG/3ML
3 SOLUTION RESPIRATORY (INHALATION) ONCE AS NEEDED
Status: CANCELLED | OUTPATIENT
Start: 2024-05-20 | End: 2035-10-17

## 2024-05-20 RX ORDER — OXYCODONE AND ACETAMINOPHEN 5; 325 MG/1; MG/1
2 TABLET ORAL ONCE
Status: CANCELLED | OUTPATIENT
Start: 2024-05-20 | End: 2024-05-20

## 2024-05-20 RX ORDER — MEPERIDINE HYDROCHLORIDE 25 MG/ML
12.5 INJECTION INTRAMUSCULAR; INTRAVENOUS; SUBCUTANEOUS ONCE
Status: CANCELLED | OUTPATIENT
Start: 2024-05-20 | End: 2024-05-20

## 2024-05-20 RX ADMIN — KETOROLAC TROMETHAMINE 30 MG: 30 INJECTION, SOLUTION INTRAMUSCULAR; INTRAVENOUS at 12:05

## 2024-05-20 NOTE — ED PROVIDER NOTES
Encounter Date: 2024       History     Chief Complaint   Patient presents with    Abdominal Pain    Vaginal Bleeding     Patient presents to the emergency department complaining of pelvic pain and vaginal bleeding.  Recent history of a missed , she he was scheduled for a D&C 2 days from now.  She reports she was having severe lower abdominal pain as well as continued bleeding.  No dizziness lightheadedness or shortness of breath.  She has not taking anything for the pain.    The history is provided by the patient. The history is limited by a language barrier. A  was used.     Review of patient's allergies indicates:  No Known Allergies  No past medical history on file.  No past surgical history on file.  No family history on file.  Social History     Tobacco Use    Smoking status: Never     Passive exposure: Never    Smokeless tobacco: Never   Substance Use Topics    Alcohol use: Never    Drug use: Never     Review of Systems   Constitutional:  Negative for chills and fever.   HENT:  Negative for congestion and sore throat.    Respiratory:  Negative for cough and shortness of breath.    Cardiovascular:  Negative for chest pain and palpitations.   Gastrointestinal:  Negative for abdominal pain and nausea.   Genitourinary:  Positive for pelvic pain and vaginal bleeding. Negative for dysuria and hematuria.   Musculoskeletal:  Negative for arthralgias and myalgias.   Neurological:  Negative for dizziness and weakness.       Physical Exam     Initial Vitals [24 2341]   BP Pulse Resp Temp SpO2   129/85 64 20 98 °F (36.7 °C) 100 %      MAP       --         Physical Exam    Nursing note and vitals reviewed.  Constitutional: She appears well-developed and well-nourished.   HENT:   Head: Normocephalic and atraumatic.   Eyes: EOM are normal. Pupils are equal, round, and reactive to light.   Neck: Neck supple.   Normal range of motion.  Cardiovascular:  Normal rate and regular rhythm.            Pulmonary/Chest: Breath sounds normal. No respiratory distress.   Abdominal: Abdomen is soft. She exhibits no distension. There is abdominal tenderness (lower abdomen). There is no rebound and no guarding.   Musculoskeletal:         General: No edema. Normal range of motion.      Cervical back: Normal range of motion and neck supple.     Neurological: She is alert and oriented to person, place, and time.   Skin: Skin is warm and dry.         ED Course   Procedures  Labs Reviewed   COMPREHENSIVE METABOLIC PANEL - Abnormal; Notable for the following components:       Result Value    Chloride 108 (*)     Blood Urea Nitrogen 6.1 (*)     Globulin 3.6 (*)     All other components within normal limits   CBC WITH DIFFERENTIAL - Abnormal; Notable for the following components:    Hgb 8.4 (*)     Hct 28.3 (*)     MCV 67.1 (*)     MCH 19.9 (*)     MCHC 29.7 (*)     RDW 19.3 (*)     All other components within normal limits   BLOOD SMEAR MICROSCOPIC EXAM (OLG) - Abnormal; Notable for the following components:    RBC Morph Abnormal (*)     Anisocytosis 2+ (*)     Hypochromasia 2+ (*)     Macrocytosis Slight (*)     Microcytosis 3+ (*)     Ovalocytes 1+ (*)     Poikilocytosis 2+ (*)     Polychromasia Slight (*)     Schistocytes Slight (*)     Tear Drops Slight (*)     All other components within normal limits   MAGNESIUM - Normal   CBC W/ AUTO DIFFERENTIAL    Narrative:     The following orders were created for panel order CBC auto differential.  Procedure                               Abnormality         Status                     ---------                               -----------         ------                     CBC with Differential[[id number]]       Abnormal            Final result                 Please view results for these tests on the individual orders.          Imaging Results    None          Medications   ketorolac injection 30 mg (30 mg Intramuscular Given 5/20/24 0003)     Medical Decision Making  Vital signs  are stable.  Hemoglobin stable.  CMP reassuring.  Pain resolved after Toradol.  Will discharge to follow up with her previously scheduled surgery.  Short prescription for Norco was given.    Amount and/or Complexity of Data Reviewed  Labs: ordered. Decision-making details documented in ED Course.    Risk  Prescription drug management.      Additional MDM:   Differential Diagnosis:   Miscarriage, Threatening miscarriage, Traumatic injury, PID, STD, among others                                     Clinical Impression:  Final diagnoses:  [O03.4] Incomplete miscarriage (Primary)  [R10.2] Pelvic pain          ED Disposition Condition    Discharge Stable          ED Prescriptions       Medication Sig Dispense Start Date End Date Auth. Provider    HYDROcodone-acetaminophen (NORCO) 5-325 mg per tablet () Take 1 tablet by mouth every 6 (six) hours as needed for Pain. 12 tablet 2024 Frederick Villafana MD          Follow-up Information       Follow up With Specialties Details Why Contact Info    [facility] Emergency Medicine Go to  If symptoms worsen [address] [address] [phone]             Frederick Villafana MD  [phone]

## 2024-05-21 ENCOUNTER — HOSPITAL ENCOUNTER (OUTPATIENT)
Facility: HOSPITAL | Age: 34
Discharge: HOME | End: 2024-05-21
Attending: OBSTETRICS & GYNECOLOGY | Admitting: OBSTETRICS & GYNECOLOGY

## 2024-05-21 VITALS
DIASTOLIC BLOOD PRESSURE: 72 MMHG | BODY MASS INDEX: 24.48 KG/M2 | SYSTOLIC BLOOD PRESSURE: 112 MMHG | HEART RATE: 54 BPM | OXYGEN SATURATION: 100 % | RESPIRATION RATE: 18 BRPM | WEIGHT: 138.19 LBS | TEMPERATURE: 98 F

## 2024-05-21 DIAGNOSIS — O02.0 ANEMBRYONIC PREGNANCY: Primary | ICD-10-CM

## 2024-05-21 DIAGNOSIS — D64.9 ANEMIA, UNSPECIFIED TYPE: ICD-10-CM

## 2024-05-21 LAB
BASOPHILS # BLD AUTO: 0.03 X10(3)/MCL
BASOPHILS NFR BLD AUTO: 0.5 %
EOSINOPHIL # BLD AUTO: 0.22 X10(3)/MCL (ref 0–0.9)
EOSINOPHIL NFR BLD AUTO: 3.9 %
ERYTHROCYTE [DISTWIDTH] IN BLOOD BY AUTOMATED COUNT: 19.4 % (ref 11.5–17)
GROUP & RH: NORMAL
HCT VFR BLD AUTO: 26.5 % (ref 37–47)
HGB BLD-MCNC: 8 G/DL (ref 12–16)
IMM GRANULOCYTES # BLD AUTO: 0.03 X10(3)/MCL (ref 0–0.04)
IMM GRANULOCYTES NFR BLD AUTO: 0.5 %
INDIRECT COOMBS: NORMAL
LYMPHOCYTES # BLD AUTO: 2.14 X10(3)/MCL (ref 0.6–4.6)
LYMPHOCYTES NFR BLD AUTO: 38 %
MCH RBC QN AUTO: 20.3 PG (ref 27–31)
MCHC RBC AUTO-ENTMCNC: 30.2 G/DL (ref 33–36)
MCV RBC AUTO: 67.1 FL (ref 80–94)
MONOCYTES # BLD AUTO: 0.23 X10(3)/MCL (ref 0.1–1.3)
MONOCYTES NFR BLD AUTO: 4.1 %
NEUTROPHILS # BLD AUTO: 2.98 X10(3)/MCL (ref 2.1–9.2)
NEUTROPHILS NFR BLD AUTO: 53 %
NRBC BLD AUTO-RTO: 0 %
PLATELET # BLD AUTO: 295 X10(3)/MCL (ref 130–400)
PMV BLD AUTO: 8.8 FL (ref 7.4–10.4)
RBC # BLD AUTO: 3.95 X10(6)/MCL (ref 4.2–5.4)
SPECIMEN OUTDATE: NORMAL
WBC # SPEC AUTO: 5.63 X10(3)/MCL (ref 4.5–11.5)

## 2024-05-21 PROCEDURE — 85025 COMPLETE CBC W/AUTO DIFF WBC: CPT

## 2024-05-21 PROCEDURE — 25000003 PHARM REV CODE 250: Performed by: STUDENT IN AN ORGANIZED HEALTH CARE EDUCATION/TRAINING PROGRAM

## 2024-05-21 PROCEDURE — 86901 BLOOD TYPING SEROLOGIC RH(D): CPT | Performed by: STUDENT IN AN ORGANIZED HEALTH CARE EDUCATION/TRAINING PROGRAM

## 2024-05-21 PROCEDURE — 25000003 PHARM REV CODE 250: Performed by: SPECIALIST

## 2024-05-21 RX ORDER — FAMOTIDINE 20 MG/1
20 TABLET, FILM COATED ORAL
Status: COMPLETED | OUTPATIENT
Start: 2024-05-21 | End: 2024-05-21

## 2024-05-21 RX ORDER — SCOLOPAMINE TRANSDERMAL SYSTEM 1 MG/1
1 PATCH, EXTENDED RELEASE TRANSDERMAL
Status: DISCONTINUED | OUTPATIENT
Start: 2024-05-21 | End: 2024-05-21 | Stop reason: HOSPADM

## 2024-05-21 RX ORDER — SODIUM CHLORIDE, SODIUM LACTATE, POTASSIUM CHLORIDE, CALCIUM CHLORIDE 600; 310; 30; 20 MG/100ML; MG/100ML; MG/100ML; MG/100ML
INJECTION, SOLUTION INTRAVENOUS CONTINUOUS
Status: DISCONTINUED | OUTPATIENT
Start: 2024-05-21 | End: 2024-05-21 | Stop reason: HOSPADM

## 2024-05-21 RX ORDER — GABAPENTIN 300 MG/1
600 CAPSULE ORAL
Status: COMPLETED | OUTPATIENT
Start: 2024-05-21 | End: 2024-05-21

## 2024-05-21 RX ORDER — ACETAMINOPHEN 500 MG
1000 TABLET ORAL EVERY 8 HOURS
Qty: 30 TABLET | Refills: 0 | Status: SHIPPED | OUTPATIENT
Start: 2024-05-21

## 2024-05-21 RX ORDER — ACETAMINOPHEN 500 MG
1000 TABLET ORAL
Status: COMPLETED | OUTPATIENT
Start: 2024-05-21 | End: 2024-05-21

## 2024-05-21 RX ORDER — IBUPROFEN 800 MG/1
800 TABLET ORAL EVERY 8 HOURS
Qty: 30 TABLET | Refills: 0 | Status: SHIPPED | OUTPATIENT
Start: 2024-05-21

## 2024-05-21 RX ORDER — TRAMADOL HYDROCHLORIDE 50 MG/1
50 TABLET ORAL
Status: COMPLETED | OUTPATIENT
Start: 2024-05-21 | End: 2024-05-21

## 2024-05-21 RX ORDER — MUPIROCIN 20 MG/G
OINTMENT TOPICAL
Status: DISCONTINUED | OUTPATIENT
Start: 2024-05-21 | End: 2024-05-21 | Stop reason: HOSPADM

## 2024-05-21 RX ORDER — MISOPROSTOL 200 UG/1
1000 TABLET ORAL ONCE AS NEEDED
Status: CANCELLED | OUTPATIENT
Start: 2024-05-21

## 2024-05-21 RX ORDER — METHYLERGONOVINE MALEATE 0.2 MG/ML
200 INJECTION INTRAVENOUS ONCE AS NEEDED
Status: CANCELLED | OUTPATIENT
Start: 2024-05-21 | End: 2035-10-18

## 2024-05-21 RX ORDER — DOXYCYCLINE HYCLATE 100 MG
200 TABLET ORAL
Status: DISCONTINUED | OUTPATIENT
Start: 2024-05-21 | End: 2024-05-21 | Stop reason: HOSPADM

## 2024-05-21 RX ORDER — MIDAZOLAM HYDROCHLORIDE 2 MG/2ML
2 INJECTION, SOLUTION INTRAMUSCULAR; INTRAVENOUS ONCE
Status: DISCONTINUED | OUTPATIENT
Start: 2024-05-21 | End: 2024-05-21 | Stop reason: HOSPADM

## 2024-05-21 RX ADMIN — DOXYCYCLINE HYCLATE 200 MG: 100 TABLET, COATED ORAL at 10:05

## 2024-05-21 RX ADMIN — SCOPOLAMINE 1 PATCH: 1 PATCH TRANSDERMAL at 10:05

## 2024-05-21 RX ADMIN — GABAPENTIN 600 MG: 300 CAPSULE ORAL at 10:05

## 2024-05-21 RX ADMIN — TRAMADOL HYDROCHLORIDE 50 MG: 50 TABLET, COATED ORAL at 10:05

## 2024-05-21 RX ADMIN — FAMOTIDINE 20 MG: 20 TABLET, FILM COATED ORAL at 09:05

## 2024-05-21 RX ADMIN — ACETAMINOPHEN 1000 MG: 500 TABLET ORAL at 10:05

## 2024-05-21 NOTE — INTERVAL H&P NOTE
"H&P Interval Update    Patient seen and evaluated by myself and the staff attending this morning. There have been no changes since her preop visit with myself and Dr. Sauer (see below). The patient is able to express in her own words the procedure undergoing on today and wishes to proceed.     Patient reports ED Sunday for increased pain. States she was given a shot of pain medicine and sent home. Patient endorses bleeding since Saturday and states she passed something "strange" last night. States it was not a clot but can't describe other than "strange".     TVUS performed and confirms passage of products of conception. EMS 1.1 cm.     Suction D&C no longer indicated, surgery has been cancelled.     Patient has been counseled on contraception and will have a follow-up visit scheduled.    Patient verbalized understanding and all questions answered.     Odalis Rodriguez MD  [facility] - PGY-2  10:40 AM 05/21/2024       "

## 2024-05-21 NOTE — LETTER
May 21, 2024         [address] [facility] [address]  Phone: [phone]  Fax: [phone]       Patient: Tiff Carrero   YOB: 1990  Date of Visit: 05/21/2024    To Whom It May Concern:    Glory Carrero  was at [facility] on 05/21/2024. The patient may return to work on 05/22/24 with no restrictions. If you have any questions or concerns, or if I can be of further assistance, please do not hesitate to contact me.    Sincerely,    Karie Henry RN        A Quien le Interese:    Tiff Carrero estuv en [facility] el 21/05/24.  Podra volver a trabajar manana sin restricciones.    Si tiene alguna pregunta o si puedo brindarle mas ayuda, no dude en maryamar al [phone]

## 2024-05-21 NOTE — DISCHARGE INSTRUCTIONS
[facility] sepideh un seguimiento contigo en 2.  Si tiene preguntas llame al [phone] o a [phone].

## 2024-05-21 NOTE — DISCHARGE SUMMARY
"[facility]  Discharge Summary  Gynecology      Admit Date: 2024    Discharge Date and Time:  2024 12:51 PM    Attending Physician: Suzanne Henry MD    Discharge Provider: Suzanne Henry MD    Reason for Admission: Scheduled surgical procedure    Procedures Performed: Procedure(s) (LRB):  DILATION AND CURETTAGE, UTERUS, USING SUCTION (N/A) - NOT PERFORMED    Hospital Course: 34 yo  presented for scheduled suction D&C in setting of anembryonic pregnancy. Upon pre-op evaluation, patient disclosed passage of "strange" vaginal discharge mixed with vaginal bleeding. Patient denied "strange" discharge was clot. TVUS performed and confirmed passage of products of conception. Scheduled suction D&C no longer indicated and surgery cancelled. H/H stable at 8.0/26.5 on CBC today.     Consults: none    Significant Diagnostic Studies: Labs: CBC   Recent Labs   Lab 24  2335 24  1159   WBC 7.24 5.63   HGB 8.4* 8.0*   HCT 28.3* 26.5*    295     Bedside US: no gestational sac present within uterine cavity, EMS 1.1 cm.     Final Diagnoses:   Principal Problem: Anembryonic pregnancy    Discharged Condition: good    Disposition: Home or Self Care    Follow Up/Patient Instructions:     Medications:  Reconciled Home Medications:      Medication List        START taking these medications      acetaminophen 500 MG tablet  Commonly known as: TYLENOL  Take 2 tablets (1,000 mg total) by mouth every 8 (eight) hours.     ibuprofen 800 MG tablet  Commonly known as: ADVIL,MOTRIN  Take 1 tablet (800 mg total) by mouth every 8 (eight) hours.            CONTINUE taking these medications      ferrous sulfate 325 mg (65 mg iron) Tab tablet  Commonly known as: FEOSOL  Take 1 tablet (325 mg total) by mouth daily with breakfast.            STOP taking these medications      HYDROcodone-acetaminophen 5-325 mg per tablet  Commonly known as: NORCO            Discharge Procedure Orders   Notify " your health care provider if you experience any of the following:  temperature >100.4     Notify your health care provider if you experience any of the following:  persistent nausea and vomiting or diarrhea     Notify your health care provider if you experience any of the following:  severe uncontrolled pain     Odalis Rodriguez MD  [facility] - PGY-2  3:02 PM 05/21/2024

## 2024-06-14 ENCOUNTER — OFFICE VISIT (OUTPATIENT)
Dept: GYNECOLOGY | Facility: CLINIC | Age: 34
End: 2024-06-14

## 2024-06-14 VITALS
HEART RATE: 62 BPM | WEIGHT: 145 LBS | OXYGEN SATURATION: 100 % | HEIGHT: 63 IN | DIASTOLIC BLOOD PRESSURE: 82 MMHG | BODY MASS INDEX: 25.69 KG/M2 | SYSTOLIC BLOOD PRESSURE: 131 MMHG

## 2024-06-14 DIAGNOSIS — Z71.85 HPV VACCINE COUNSELING: ICD-10-CM

## 2024-06-14 DIAGNOSIS — Z11.3 SCREENING EXAMINATION FOR STD (SEXUALLY TRANSMITTED DISEASE): ICD-10-CM

## 2024-06-14 DIAGNOSIS — Z12.4 PAP SMEAR FOR CERVICAL CANCER SCREENING: ICD-10-CM

## 2024-06-14 DIAGNOSIS — O03.9 SAB (SPONTANEOUS ABORTION): Primary | ICD-10-CM

## 2024-06-14 LAB
C TRACH DNA SPEC QL NAA+PROBE: NOT DETECTED
CLUE CELLS VAG QL WET PREP: ABNORMAL
N GONORRHOEA DNA SPEC QL NAA+PROBE: NOT DETECTED
SOURCE (OHS): NORMAL
T VAGINALIS VAG QL WET PREP: ABNORMAL
WBC #/AREA VAG WET PREP: ABNORMAL
YEAST SPEC QL WET PREP: ABNORMAL

## 2024-06-14 PROCEDURE — 99213 OFFICE O/P EST LOW 20 MIN: CPT | Mod: PBBFAC,25

## 2024-06-14 PROCEDURE — 90471 IMMUNIZATION ADMIN: CPT | Mod: PBBFAC

## 2024-06-14 PROCEDURE — 90651 9VHPV VACCINE 2/3 DOSE IM: CPT | Mod: PBBFAC

## 2024-06-14 PROCEDURE — 87625 HPV TYPES 16 & 18 ONLY: CPT

## 2024-06-14 PROCEDURE — 87625 HPV TYPES 16 & 18 ONLY: CPT | Mod: 59

## 2024-06-14 PROCEDURE — 87210 SMEAR WET MOUNT SALINE/INK: CPT

## 2024-06-14 PROCEDURE — 87491 CHLMYD TRACH DNA AMP PROBE: CPT

## 2024-06-14 PROCEDURE — 87591 N.GONORRHOEAE DNA AMP PROB: CPT

## 2024-06-14 PROCEDURE — 87624 HPV HI-RISK TYP POOLED RSLT: CPT

## 2024-06-14 RX ADMIN — HUMAN PAPILLOMAVIRUS 9-VALENT VACCINE, RECOMBINANT 0.5 ML: 30; 40; 60; 40; 20; 20; 20; 20; 20 INJECTION, SUSPENSION INTRAMUSCULAR at 09:06

## 2024-06-14 NOTE — PROGRESS NOTES
"[facility] OB/GYN CLINIC NOTE  [facility]  [address] [place] [address]  Phone: [phone]  Fax: [phone]    Postoperative Follow-Up    Subjective:      Tiff Carrero is a 33 y.o. G***P*** who presents to the clinic *** weeks post-op s/p *** 2/2 *** on ***.    Eating a regular diet without*** difficulty with normal*** bowel movements. Patient is not complaining of pain currently***. No episodes of vaginal bleeding***.    Patient's medications, allergies, past medical, surgical, social and family histories were reviewed and updated as appropriate.***    Operative Findings:  EBL: ***mL  Uterus: ***g    ***    Review of Systems  Denies fevers, chills, headache, blurry vision, nausea, vomiting, dizziness, or syncope. ***  Denies chest pain, shortness of breath, RUQ pain, or calf pain.***     Objective:     Vitals:    06/14/24 0855   BP: 131/82   BP Location: Left arm   Pulse: 62   SpO2: 100%   Weight: 65.8 kg (145 lb)   Height: 5' 3" (1.6 m)       Physical Exam: ***    General: Alert and oriented, in no acute distress  Lungs: Clear to auscultation bilaterally, no conversational dyspnea  Heart: Regular rate and rhythm  Abdomen: Soft, non-distended, non tender*** to palpation, no involuntary guarding, no rebound tenderness  Incision Sites: ***Clean/dry/intact  Extremities: Normal, atraumatic, non-edematous, No cords or calf tenderness, No significant calf/ankle edema  External genitalia: Normal female genitalia without lesion, discharge or tenderness.*** Normal appearing urethral meatus***. Normal appearing external anus.***  Bimanual Exam: No pelvic lymphadenopathy noted bilaterally. Vagina with adequate capacity***. Uterus 8***cm in size, no cervical motion tenderness. Smooth in contour, no masses. Good descent, mobile. ***. No adnexal fullness/tenderness. Normal urethra. Normal bladder  Speculum Exam: Vaginal mucosa normal in appearance.*** Pink. No masses/lesions. Cervix well visualized, smooth in " contour no masses or lesions***. Os normal in appearance, no blood or discharge coming from the os    Note: RN chaperone present for entirety of exam.    Pathology:  ***    Assessment:     Tiff Carrero is a 33 y.o. G***P*** s/p *** 2/2 *** on ***. Doing well*** post-operatively.  Operative findings again reviewed. Pathology report discussed.***     Plan:     Continue any current medications.  Wound care discussed.  Activity restrictions: ***  Anticipated return to work: Now  Follow up: *** weeks.    Patient and plan were discussed with  ***.

## 2024-06-14 NOTE — PROGRESS NOTES
[facility] OB/GYN CLINIC NOTE  [facility]  [address] [place] [address]  Phone: [phone]  Fax: [phone]    Subjective:     Tiff Carrero is a 33 y.o.  who presents for follow up after SAB on 2024.    Today she reports:  - Doing well, no issues since SAB. Denies abdominal pain, vaginal bleeding, discharge, odor.  - Galactorrhea with nipple stimulation only. Occurred after her last SAB also.  - Wants to get pregnant with new partner.    Allergies: NKDA  OBHx: 4 SVDs, 2 SABs  GynHx:   Menarche @ 13, Regular cyclic menses, 3 days of flow  LMP: 2024  Denies Hx of STIs and abnormal paps.  Contraception: Previously used OCPs for 6 years.    MedHx: Denies  History reviewed. No pertinent past medical history.    SurgHx: None    Medications:    Current Outpatient Medications:     acetaminophen (TYLENOL) 500 MG tablet, Take 2 tablets (1,000 mg total) by mouth every 8 (eight) hours., Disp: 30 tablet, Rfl: 0    ferrous sulfate (FEOSOL) 325 mg (65 mg iron) Tab tablet, Take 1 tablet (325 mg total) by mouth daily with breakfast., Disp: 30 tablet, Rfl: 0    ibuprofen (ADVIL,MOTRIN) 800 MG tablet, Take 1 tablet (800 mg total) by mouth every 8 (eight) hours., Disp: 30 tablet, Rfl: 0  No current facility-administered medications for this visit.    FM Hx: Denies hx of ovarian, uterine, endometrial, or colon cancer. Denies history of bleeding or coagulation disorders.  Family History   Problem Relation Name Age of Onset    No Known Problems Father      No Known Problems Mother      No Known Problems Brother      No Known Problems Sister         Social Hx: Denies tobacco, alcohol and illicit drug usage.  Social History     Socioeconomic History    Marital status:    Tobacco Use    Smoking status: Never     Passive exposure: Never    Smokeless tobacco: Never   Substance and Sexual Activity    Alcohol use: Never    Drug use: Never    Sexual activity: Yes       Review of Systems  Denies fevers,  "chills, headache, blurry vision, nausea, vomiting, dizziness, or syncope.  Denies chest pain, shortness of breath, RUQ pain, or calf pain.    Objective:     Vitals:    24 0855   BP: 131/82   BP Location: Left arm   Pulse: 62   SpO2: 100%   Weight: 65.8 kg (145 lb)   Height: 5' 3" (1.6 m)     Body mass index is 25.69 kg/m².    Physical Exam:    General: Alert and oriented, in no acute distress.  Neck: No thyromegaly or nodules, non-tender.  Breasts: Normal appearing breasts, no masses, no skin changes, no nipple retraction. Minimal galactorrhea expressed bilaterally. No axillary lymphadenopathy.  Lungs: Clear to auscultation bilaterally, no conversational dyspnea.  Heart: Regular rate and rhythm.  Abdomen: Soft, non-distended, non tender to palpation, no involuntary guarding, no rebound tenderness.  Extremities: Normal, atraumatic, non-edematous, No cords or calf tenderness, No significant calf/ankle edema.  External genitalia: Normal female genitalia without lesion, discharge or tenderness. Normal appearing urethral meatus. Normal appearing external anus.  Bimanual Exam: No pelvic lymphadenopathy noted bilaterally. Vagina with adequate capacity. Uterus 7 cm in size, no cervical motion tenderness. Smooth in contour, no masses. No adnexal fullness/tenderness. Normal urethra. Normal bladder.  Speculum Exam: Vaginal mucosa normal in appearance. Pink. No masses/lesions. Cervix well visualized, smooth in contour no masses or lesions, ectropion noted. Os normal in appearance, no blood or discharge coming from the os.    Note: RN chaperone present for entirety of exam.     Imaging  No images are attached to the encounter.  Assessment/Plan:    Tiff Carrero is a 33 y.o.  doing well after recent SAB.    SAB:  Doing well, no issues. Good support at home.  Has not yet had menses since SAB, UPT negative today.  Trying to get pregnancy again, recommended starting PNV and continuing PO iron.  Health " maintenance:  Breast exam wnl. Pt advised to avoid nipple stimulation to avoid galactorrhea.  Pap collected.  Counseled about HPV vaccination, would like to start gardasil series. First dose given today.  STD testing: HIV, syphilis, GC/CT, wet prep collected. Will contact with results if abnormal.  RTC in 1 year for well woman exam with NP.    Future Appointments   Date Time Provider Department Center   8/15/2024  9:40 AM NURSE, [facility]   12/12/2024  9:40 AM NURSE, [facility]   6/16/2025 10:30 AM Velia Infante, [facility]     Patient and plan were discussed with Dr. Lopez.    Tommie Mart MD  [facility], PGY-2

## 2024-06-14 NOTE — LETTER
June 14, 2024      [facility]  [address] [address]  Phone: [phone]       Patient: Tiff Carrero   YOB: 1990  Date of Visit: 06/14/2024    To Whom It May Concern:    Glory Carrero  was at [facility] on 06/14/2024. The patient may return to work/school on 6/14/2024 with no restrictions. If you have any questions or concerns, or if I can be of further assistance, please do not hesitate to contact me.    Sincerely,    Suzanne Henry MD

## 2024-06-19 LAB
HIGH RISK HPV 16: NEGATIVE
HIGH RISK HPV 18/45: NEGATIVE
HIGH RISK HPV: POSITIVE
PSYCHE PATHOLOGY RESULT: NORMAL

## 2024-06-20 ENCOUNTER — TELEPHONE (OUTPATIENT)
Dept: GYNECOLOGY | Facility: CLINIC | Age: 34
End: 2024-06-20

## 2024-06-20 DIAGNOSIS — R87.810 ASCUS WITH POSITIVE HIGH RISK HPV CERVICAL: Primary | ICD-10-CM

## 2024-06-20 DIAGNOSIS — R87.610 ASCUS WITH POSITIVE HIGH RISK HPV CERVICAL: Primary | ICD-10-CM

## 2024-06-20 NOTE — TELEPHONE ENCOUNTER
Attempted to call patient regarding recent pap results. No answer. Will try again later.    Results:  ASCUS, non 16/18/45 HRHPV+    Recommendation:  - Colposcopy for further evaluation per ASCCP guidelines.    Tommie Mart MD  [facility], PGY-2

## 2024-06-21 ENCOUNTER — TELEPHONE (OUTPATIENT)
Dept: GYNECOLOGY | Facility: CLINIC | Age: 34
End: 2024-06-21

## 2024-06-21 NOTE — TELEPHONE ENCOUNTER
----- Message from Tommie Mart MD sent at 6/21/2024  8:05 AM CDT -----    ----- Message -----  From: Lab, Background User  Sent: 6/14/2024  11:21 AM CDT  To: Tommie Mart MD      Managed to get patient on phone for Dr Moya who gave her pap results and a colpo date of Aug 2nd at 8:00am

## 2024-08-01 ENCOUNTER — TELEPHONE (OUTPATIENT)
Dept: GYNECOLOGY | Facility: CLINIC | Age: 34
End: 2024-08-01

## 2024-08-02 ENCOUNTER — LAB VISIT (OUTPATIENT)
Dept: LAB | Facility: HOSPITAL | Age: 34
End: 2024-08-02

## 2024-08-02 ENCOUNTER — PROCEDURE VISIT (OUTPATIENT)
Dept: GYNECOLOGY | Facility: CLINIC | Age: 34
End: 2024-08-02

## 2024-08-02 VITALS
SYSTOLIC BLOOD PRESSURE: 118 MMHG | HEIGHT: 63 IN | BODY MASS INDEX: 25.85 KG/M2 | DIASTOLIC BLOOD PRESSURE: 79 MMHG | OXYGEN SATURATION: 99 % | HEART RATE: 70 BPM | WEIGHT: 145.88 LBS | RESPIRATION RATE: 18 BRPM | TEMPERATURE: 98 F

## 2024-08-02 DIAGNOSIS — N87.9 CERVICAL DYSPLASIA: ICD-10-CM

## 2024-08-02 DIAGNOSIS — R87.610 ATYPICAL SQUAMOUS CELLS OF UNDETERMINED SIGNIFICANCE ON CYTOLOGIC SMEAR OF CERVIX (ASC-US): Primary | ICD-10-CM

## 2024-08-02 DIAGNOSIS — Z34.90 PREGNANCY AT EARLY STAGE: ICD-10-CM

## 2024-08-02 DIAGNOSIS — Z11.3 SCREENING EXAMINATION FOR STD (SEXUALLY TRANSMITTED DISEASE): ICD-10-CM

## 2024-08-02 LAB
B-HCG FREE SERPL-ACNC: ABNORMAL MIU/ML
B-HCG UR QL: POSITIVE
CTP QC/QA: YES
HIV 1+2 AB+HIV1 P24 AG SERPL QL IA: NONREACTIVE
T PALLIDUM AB SER QL: NONREACTIVE

## 2024-08-02 PROCEDURE — 87389 HIV-1 AG W/HIV-1&-2 AB AG IA: CPT

## 2024-08-02 PROCEDURE — 84702 CHORIONIC GONADOTROPIN TEST: CPT

## 2024-08-02 PROCEDURE — 36415 COLL VENOUS BLD VENIPUNCTURE: CPT

## 2024-08-02 PROCEDURE — 86780 TREPONEMA PALLIDUM: CPT

## 2024-08-02 NOTE — LETTER
August 2, 2024      [facility]  [address] [address]  Phone: [phone]       Patient: Tiff Carrero   YOB: 1990  Date of Visit: 08/02/2024    To Whom It May Concern:    Glory Carrero  was at [facility] on 08/02/2024. The patient may return to work/school on 08/05/2024 with no restrictions. If you have any questions or concerns, or if I can be of further assistance, please do not hesitate to contact me.    Sincerely,    HI Sauer MD

## 2024-08-02 NOTE — PROCEDURES
[facility] COLPOSCOPY CLINIC NOTE     Tiff Carrero is a 33 y.o.  here for colposcopy. Colposcopy was deferred as POCT UPT was was positive. Patient reports that she desires fertility and has been having unprotected sexual intercourse. LMP was 2024.     Pap History:   2024 ASCUS; OHR HPV+    Sexual History:    Number of sex partners: Currently sexually active with 1 male partner; Lifetime 4  Contraception:  Previously on OCPs; currently not on any contraception  Future fertility desires: YES/NO: Yes  Smoking History: History smoking: none  HIV status: Negative  HPV vaccination status: S/p 1st dose on 2024; Dose #2 and #3 scheduled    GYN History:  Last menstrual period: 2024     Gynecology  OB History          6    Para   4    Term   4            AB   2    Living   4         SAB   1    IAB        Ectopic        Multiple        Live Births                    Past Medical History:   Diagnosis Date    Abnormal Pap smear of cervix     Anemia       History reviewed. No pertinent surgical history.     Social History     Tobacco Use    Smoking status: Never     Passive exposure: Never    Smokeless tobacco: Never   Substance Use Topics    Alcohol use: Not Currently    Drug use: Never     Review of Systems  Pertinent items are noted in HPI.     Objective:     Breastfeeding No   Physical Exam:  Gen: Well-nourished, well-developed female appearing stated age. Alert, cooperative, in no acute distress.  Assessment:       33 y.o.  here for:    1. Atypical squamous cells of undetermined significance on cytologic smear of cervix (ASC-US)  POCT urine pregnancy      2. Cervical dysplasia  CANCELED: Colposcopy      3. Pregnancy at early stage  HCG, Quantitative    Ambulatory referral/consult to Family Practice        Plan:     Problem List Items Addressed This Visit    None  Visit Diagnoses       Atypical squamous cells of undetermined significance on cytologic smear of cervix  (ASC-US)    -  Primary    Relevant Orders    POCT urine pregnancy (Completed)    Cervical dysplasia        Pregnancy at early stage        Relevant Orders    HCG, Quantitative    Ambulatory referral/consult to Family Practice          Unable to perform colposcopy at this time due to positive UPT  bHCG ordered and TVUS ordered; will refer to Family Practice for OB care  For abnormal pap smear, recommend colposcopy without biopsy or ECC in 2nd and 3rd trimester with full colposcopy after delivery  Defer Gardasil dose 2 and 3 until post pregnancy  Will schedule patient for appointment at approximately 20 wga per LMP for colposcopy     Leticia Sauer MD  [facility], PGY-2  8:19 AM 08/02/2024

## 2024-09-05 ENCOUNTER — HOSPITAL ENCOUNTER (OUTPATIENT)
Dept: RADIOLOGY | Facility: HOSPITAL | Age: 34
Discharge: HOME | End: 2024-09-05
Attending: OBSTETRICS & GYNECOLOGY

## 2024-09-05 ENCOUNTER — OFFICE VISIT (OUTPATIENT)
Dept: FAMILY MEDICINE | Facility: CLINIC | Age: 34
End: 2024-09-05

## 2024-09-05 VITALS
HEART RATE: 69 BPM | DIASTOLIC BLOOD PRESSURE: 78 MMHG | WEIGHT: 147 LBS | OXYGEN SATURATION: 100 % | TEMPERATURE: 99 F | SYSTOLIC BLOOD PRESSURE: 124 MMHG | BODY MASS INDEX: 26.05 KG/M2 | HEIGHT: 63 IN | RESPIRATION RATE: 16 BRPM

## 2024-09-05 DIAGNOSIS — Z3A.10 10 WEEKS GESTATION OF PREGNANCY: ICD-10-CM

## 2024-09-05 DIAGNOSIS — Z3A.11 11 WEEKS GESTATION OF PREGNANCY: ICD-10-CM

## 2024-09-05 DIAGNOSIS — Z34.90 PREGNANCY AT EARLY STAGE: ICD-10-CM

## 2024-09-05 DIAGNOSIS — Z34.90 PREGNANCY: ICD-10-CM

## 2024-09-05 LAB
BACTERIA #/AREA URNS AUTO: ABNORMAL /HPF
BASOPHILS # BLD AUTO: 0.03 X10(3)/MCL
BASOPHILS NFR BLD AUTO: 0.5 %
BILIRUB SERPL-MCNC: NEGATIVE MG/DL
BILIRUB UR QL STRIP.AUTO: NEGATIVE
BLOOD URINE, POC: NORMAL
C TRACH DNA SPEC QL NAA+PROBE: NOT DETECTED
CLARITY UR: ABNORMAL
CLARITY, POC UA: CLEAR
COLOR UR AUTO: COLORLESS
COLOR, POC UA: NORMAL
EOSINOPHIL # BLD AUTO: 0.17 X10(3)/MCL (ref 0–0.9)
EOSINOPHIL NFR BLD AUTO: 2.8 %
ERYTHROCYTE [DISTWIDTH] IN BLOOD BY AUTOMATED COUNT: 15.3 % (ref 11.5–17)
GLUCOSE UR QL STRIP: NEGATIVE
GLUCOSE UR QL STRIP: NORMAL
GROUP & RH: NORMAL
HBV SURFACE AG SERPL QL IA: NONREACTIVE
HCT VFR BLD AUTO: 34.3 % (ref 37–47)
HCV AB SERPL QL IA: NONREACTIVE
HGB BLD-MCNC: 11 G/DL (ref 12–16)
HGB S BLD QL SOLY: NEGATIVE
HGB UR QL STRIP: NEGATIVE
HIV 1+2 AB+HIV1 P24 AG SERPL QL IA: NONREACTIVE
HYALINE CASTS #/AREA URNS LPF: ABNORMAL /LPF
IMM GRANULOCYTES # BLD AUTO: 0.01 X10(3)/MCL (ref 0–0.04)
IMM GRANULOCYTES NFR BLD AUTO: 0.2 %
INDIRECT COOMBS: NORMAL
KETONES UR QL STRIP: NEGATIVE
KETONES UR QL STRIP: NEGATIVE
LEUKOCYTE ESTERASE UR QL STRIP: 500
LEUKOCYTE ESTERASE URINE, POC: NORMAL
LYMPHOCYTES # BLD AUTO: 1.72 X10(3)/MCL (ref 0.6–4.6)
LYMPHOCYTES NFR BLD AUTO: 28.7 %
MCH RBC QN AUTO: 24.3 PG (ref 27–31)
MCHC RBC AUTO-ENTMCNC: 32.1 G/DL (ref 33–36)
MCV RBC AUTO: 75.7 FL (ref 80–94)
MONOCYTES # BLD AUTO: 0.4 X10(3)/MCL (ref 0.1–1.3)
MONOCYTES NFR BLD AUTO: 6.7 %
N GONORRHOEA DNA SPEC QL NAA+PROBE: NOT DETECTED
NEUTROPHILS # BLD AUTO: 3.67 X10(3)/MCL (ref 2.1–9.2)
NEUTROPHILS NFR BLD AUTO: 61.1 %
NITRITE UR QL STRIP: NEGATIVE
NITRITE, POC UA: NEGATIVE
NRBC BLD AUTO-RTO: 0 %
PH UR STRIP: 6 [PH]
PH, POC UA: 6.5
PLATELET # BLD AUTO: 301 X10(3)/MCL (ref 130–400)
PMV BLD AUTO: 9.6 FL (ref 7.4–10.4)
PROT UR QL STRIP: NEGATIVE
PROTEIN, POC: NEGATIVE
RBC # BLD AUTO: 4.53 X10(6)/MCL (ref 4.2–5.4)
RBC #/AREA URNS AUTO: ABNORMAL /HPF
SOURCE (OHS): NORMAL
SP GR UR STRIP.AUTO: <1.005 (ref 1–1.03)
SPECIFIC GRAVITY, POC UA: 1.01
SPECIMEN OUTDATE: NORMAL
SQUAMOUS #/AREA URNS LPF: ABNORMAL /HPF
T PALLIDUM AB SER QL: NONREACTIVE
UROBILINOGEN UR STRIP-ACNC: NORMAL
UROBILINOGEN, POC UA: 0.2
WBC # BLD AUTO: 6 X10(3)/MCL (ref 4.5–11.5)
WBC #/AREA URNS AUTO: ABNORMAL /HPF

## 2024-09-05 PROCEDURE — 81015 MICROSCOPIC EXAM OF URINE: CPT

## 2024-09-05 PROCEDURE — 87491 CHLMYD TRACH DNA AMP PROBE: CPT

## 2024-09-05 PROCEDURE — 87340 HEPATITIS B SURFACE AG IA: CPT

## 2024-09-05 PROCEDURE — 76801 OB US < 14 WKS SINGLE FETUS: CPT | Mod: TC

## 2024-09-05 PROCEDURE — 87086 URINE CULTURE/COLONY COUNT: CPT

## 2024-09-05 PROCEDURE — 85025 COMPLETE CBC W/AUTO DIFF WBC: CPT

## 2024-09-05 PROCEDURE — 86901 BLOOD TYPING SEROLOGIC RH(D): CPT

## 2024-09-05 PROCEDURE — 99214 OFFICE O/P EST MOD 30 MIN: CPT | Mod: PBBFAC,25

## 2024-09-05 PROCEDURE — 85660 RBC SICKLE CELL TEST: CPT

## 2024-09-05 PROCEDURE — 86762 RUBELLA ANTIBODY: CPT

## 2024-09-05 PROCEDURE — 36415 COLL VENOUS BLD VENIPUNCTURE: CPT

## 2024-09-05 PROCEDURE — 86900 BLOOD TYPING SEROLOGIC ABO: CPT

## 2024-09-05 PROCEDURE — 86780 TREPONEMA PALLIDUM: CPT

## 2024-09-05 PROCEDURE — 86803 HEPATITIS C AB TEST: CPT

## 2024-09-05 PROCEDURE — 86787 VARICELLA-ZOSTER ANTIBODY: CPT

## 2024-09-05 PROCEDURE — 87389 HIV-1 AG W/HIV-1&-2 AB AG IA: CPT

## 2024-09-05 NOTE — PATIENT INSTRUCTIONS
Well Child Exam    About this topic  A well child exam is a visit with your child's doctor to check your child's health. The doctor will check your child's growth, progress, and shot record. It is also a time for you to ask your child's doctor any questions you have about your child's health. Your child will have a full exam during the office visit. Other things that are sometimes checked are hearing, eyesight, and urine or blood tests. The doctor may give shots during your child's well visit.    General    Getting Ready for a Well Child Exam    A well child exam is a good time for you to talk with your child's doctor about any of these topics:    Eating habits or diet    How your child acts    Sleep issues    Growth    Safety    Vaccines    Toilet training    Teen years    How your child is doing in school or any learning concerns    Home life    You may want to make a written list of the things you want to talk about with your child's doctor. Be sure to bring your list of questions to your child's well visit. You may also want to do some research on your own before your office visit by reading books or looking at Web sites. Other family members, child caregivers, and grandparents may be able to help you too. Your child's doctor may ask also you about your family's health history or if your child is around anyone who smokes.    The Exam    The doctor measures your child's weight, height, and sometimes head size or body mass index (BMI). The doctor plots these numbers on a growth curve. The growth curve gives a picture of your baby's growth at each visit. The doctor may check your child's temperature, blood pressure, breathing, and heart rate. The doctor may listen to your child's heart, lungs, and belly. Your doctor will do a full exam of your child from the head to the toes.    Growth and Development Questions    Your doctor will ask you about your child's progress. The doctor will focus on the skills that are  likely to happen at your child's age. Some of these are motor skills like rolling over, walking, and running, while others are social skills, or how your child interacts with other people. Your child's doctor will also ask you how your child is doing in school.    Help for Parents    Your doctor will talk with you about any concerns you have about your child during this visit. The doctor may also talk with you about:    Getting family help or other support    Ways to help your child's brain growth    How your child plays and acts with others    Ways to help your child exercise    Safety    Eating habits    Vaccines    Quitting smoking    Help if you have a low mood after having a baby    Shots or Vaccines    It is important for your child to get shots on time. This protects from very serious illnesses like pertussis, measles, or some kinds of pneumonia. Sometimes, your child may need more than one dose of vaccine. The vaccines used today are safer than ever. Talk to your doctor if you have any questions or concerns about giving your child vaccines.    Well Child Exam Schedule    The American Academy of Pediatrics (AAP) suggests this plan for well child visits:    [place] (3 to 5 days old)    1 month old    2 months old    4 months old    6 months old    9 months old    12 months old    15 months old    18 months old    2 years old    30 months old    3 years old    4 years old    Once each year until age 21    Well child exams are very important. Since your child is healthy at this visit and it is scheduled ahead of time, you can think about things you want to ask your child's doctor. Be sure to follow the above plan for well child visits as well as any other visits your child's doctor suggests.    Where can I learn more?    Centers for Disease Control and Prevention    http://www.cdc.gov/vaccines     Healthy  Children    https://www.healthychildren.org/English/family-life/health-management/Pages/Well-Child-Care-A-Check-Up-for-Success.aspx    Disclaimer.  This generalized information is a limited summary of diagnosis, treatment, and/or medication information. It is not meant to be comprehensive and should be used as a tool to help the user understand and/or assess potential diagnostic and treatment options. It does NOT include all information about conditions, treatments, medications, side effects, or risks that may apply to a specific patient. It is not intended to be medical advice or a substitute for the medical advice, diagnosis, or treatment of a health care provider based on the health care provider's examination and assessment of a patients specific and unique circumstances. Patients must speak with a health care provider for complete information about their health, medical questions, and treatment options, including any risks or benefits regarding use of medications. This information does not endorse any treatments or medications as safe, effective, or approved for treating a specific patient. UpToDate, Inc[employer] and its affiliates disclaim any warranty or liability relating to this information or the use thereof. The use of this information is governed by the Terms of Use, available at Terms of Use. ©2022 UpToDate, Inc. and its affiliates and/or licensors. All rights reserved.

## 2024-09-05 NOTE — PROGRESS NOTES
OB Office Visit Note    Name: Tiff Carrero  MRN: [id number]  Date: 2024    Subjective:      [id number] =      Chief Complaint: Initial Prenatal Visit (INOB 10w4d, based on LMP. C/O nausea.)      Tiff Carrero is a 33 y.o.  at 10+4 via U/S ; JOSE RAMON 3/30/25    Current issues: has no unusual complaints. Reports some nausea with specific odors.     Chronic issues: No     Previous pregnancies all uncomplicated     PMHx: none  PSHx: none  SH:  Has good support at home , no smoking drinking or drug use  FHx: No reported pertinent FHx  Meds:   Prior to Admission medications    Medication Sig Start Date End Date Taking? Authorizing Provider   acetaminophen (TYLENOL) 500 MG tablet Take 2 tablets (1,000 mg total) by mouth every 8 (eight) hours.  Patient not taking: Reported on 2024   Odalis Rodriguez MD   ferrous sulfate (FEOSOL) 325 mg (65 mg iron) Tab tablet Take 1 tablet (325 mg total) by mouth daily with breakfast.  Patient not taking: Reported on 2024 5/15/24   Odalis Rodriguez MD   ibuprofen (ADVIL,MOTRIN) 800 MG tablet Take 1 tablet (800 mg total) by mouth every 8 (eight) hours.  Patient not taking: Reported on 2024   Odalis Rodriguez MD     Allergies: Review of patient's allergies indicates:  No Known Allergies    Gestational History:   OB History    Para Term  AB Living   7 4 4 0 2 4   SAB IAB Ectopic Multiple Live Births   2 0 0 0 4      # Outcome Date GA Lbr Myke/2nd Weight Sex Type Anes PTL Lv   7 Current            6 SAB 2024 6w0d    SAB None  FD   5 SAB 2023 8w0d    SAB None  FD   4 Term 14 40w0d  3.629 kg (8 lb) M Vag-Spont None N RAKESH   3 Term 12 40w0d  3.175 kg (7 lb) F Vag-Spont None N RAKESH   2 Term 10/07/09 40w0d  3.175 kg (7 lb) M Vag-Spont None N RAKESH   1 Term 05 40w0d  3.175 kg (7 lb) M Vag-Spont None N RAKESH       GYNHx:   LMP: Patient's last menstrual period was 2024 (exact date).   Menarche at  "13  Hx of birth control: injections , nexplanon, OCPs   History of Abnormal PAP: No   6/19/2024      Antepartum specific ROS  - Fetal movements: No  - Vaginal bleeding: No  - Vaginal discharge: No  - Loss of fluid: No  - Contractions: No  - Headaches: No  - Vision changes: No  - Edema: No    Review of Systems  Constitutional: no fever, no chills  CV: no chest pain  RESP: no SOB  : no dysuria, no hematuria  GI: no constipation, no diarrhea, no nausea, no vomiting  Psych: no depression, no anxiety; No SI/HI    Objective:      Vitals:    09/05/24 1151   BP: 124/78   BP Location: Right arm   Patient Position: Sitting   BP Method: Medium (Automatic)   Pulse: 69   Resp: 16   Temp: 98.6 °F (37 °C)   TempSrc: Oral   SpO2: 100%   Weight: 66.7 kg (147 lb)   Height: 5' 3" (1.6 m)       Lab Results   Component Value Date    COLORU Light Yellow 09/05/2024    SPECGRAV 1.010 09/05/2024    PHUR 6.5 09/05/2024    WBCUR small 09/05/2024    NITRITE negative 09/05/2024    PROTEINPOC negative 09/05/2024    GLUCOSEUR negative 09/05/2024    KETONESU negative 09/05/2024    UROBILINOGEN 0.2 09/05/2024    BILIRUBINPOC negative 09/05/2024    RBCUR trace-intact 09/05/2024       General:   RESP: clear to auscultation bilaterally, non labored  CV: regular rate and rhythm, no murmurs, no edema  ABD: gravid, nontender, BS+ soft, nontender, nondistended, no abnormal masses, no epigastric pain and FHT present  FHTs: 134 bpm; (u/s)  Fundal height: unable to palpate  Cervix: no lesions or cervical motion tenderness ; thin white discharge     Initial OB Labs: Ordered 9/5/24  - Blood Type and Rh: pending  - Antibody Screen: pending  - CBC H/H: pending  - HIV: NR  - RPR: NR  - GC: pending  - CT: pending  - HBsAg: pending  - HCVAb: pending  - Rubella: pending  - Varicella: pending  - UA & Culture: pending  - Sickle Cell Screen: pending  - PAP: pending      Urine dip:  Lab Results   Component Value Date    COLORU Light Yellow 09/05/2024    SPECGRAV 1.010 " 09/05/2024    PHUR 6.5 09/05/2024    WBCUR small 09/05/2024    NITRITE negative 09/05/2024    PROTEINPOC negative 09/05/2024    GLUCOSEUR negative 09/05/2024    KETONESU negative 09/05/2024    UROBILINOGEN 0.2 09/05/2024    BILIRUBINPOC negative 09/05/2024    RBCUR trace-intact 09/05/2024     Assessment/Plan:     Tiff was seen today for initial prenatal visit.    Diagnoses and all orders for this visit:    Pregnancy at early stage  -     Ambulatory referral/consult to Family Practice    11 weeks gestation of pregnancy  -     Sickle Cell Screen; Future  -     Varicella Zoster Antibody, IgG; Future  -     Rubella Antibody, IgG; Future  -     Hepatitis C Antibody; Future  -     Hepatitis B Surface Antigen; Future  -     SYPHILIS ANTIBODY (WITH REFLEX RPR); Future  -     HIV 1/2 Ag/Ab (4th Gen); Future  -     Type & Screen; Future  -     Chlamydia/GC, PCR  -     Liquid-Based Pap Smear, Screening  -     Urinalysis  -     Urine Culture High Risk  -     CBC Auto Differential; Future  -     CBC Auto Differential  -     Type & Screen  -     HIV 1/2 Ag/Ab (4th Gen)  -     SYPHILIS ANTIBODY (WITH REFLEX RPR)  -     Hepatitis B Surface Antigen  -     Hepatitis C Antibody  -     Rubella Antibody, IgG  -     Varicella Zoster Antibody, IgG  -     Sickle Cell Screen    10 weeks gestation of pregnancy  -     POCT URINE DIPSTICK WITHOUT MICROSCOPE       Recommend avoiding noxious stimuli for her nausea    Pregnancy  -     POCT urine dipstick without microscope  -     OB Protocol   -     PNVs  -     Urine dip reviewed as above  -     Routine (initial) labs: as mentioned above/pending  -     Mother plans to breast and/or bottlefeed  -     Postpartum contraception discussion: PENDING  -     Labor precautions discussed in depth    Return to clinic in Follow up in about 4 weeks (around 10/3/2024).    Ruben Cash MD  [facility], PGY-2

## 2024-09-06 ENCOUNTER — TELEPHONE (OUTPATIENT)
Dept: FAMILY MEDICINE | Facility: CLINIC | Age: 34
End: 2024-09-06

## 2024-09-06 LAB
RUBV IGG SERPL IA-ACNC: 7
RUBV IGG SERPL QL IA: POSITIVE
VZV IGG SER IA-ACNC: 4.4
VZV IGG SER QL IA: POSITIVE

## 2024-09-06 NOTE — TELEPHONE ENCOUNTER
9/5/24    LCSW received a referral from JAIME Lo RN, requesting assistance in navigating insurance barriers. LCSW met with patient in the clinic room after meeting with the physician. Translation services utilized for the visit [ID #[id number]; : Herlinda]. Patient is form [place] and [place] the [place]. Patient reports that she is 10 weeks pregnant and this is her 7th pregnancy, although she has had two miscarriages. All of patient's previous children were delivered in [place]. Patient has never applied to Medicaid and is unsure of how to apply. LCSW explained numerous ways to apply to medicaid [I.e. in person, via phone, on paper, at [facility]]. LCSW explained the importance of notifying Medicaid of her pregnancy status during application as well as expectations of response time from medicaid [approximately 4 weeks]. LCSW also provided instructions on different ways to apply in Czech in a handout. Patient additionally had questions about applying for WIC, as patient plans to breast and formula feed baby. LCSW will refer patient to bi-lingual  at [facility], [place], to assist with navigating WIC. Patient denied further unmet needs at this time and was encouraged to contact LCSW if further unmet needs arise.

## 2024-09-07 LAB — BACTERIA UR CULT: NORMAL

## 2024-10-02 ENCOUNTER — OFFICE VISIT (OUTPATIENT)
Dept: FAMILY MEDICINE | Facility: CLINIC | Age: 34
End: 2024-10-02

## 2024-10-02 VITALS
RESPIRATION RATE: 18 BRPM | DIASTOLIC BLOOD PRESSURE: 72 MMHG | SYSTOLIC BLOOD PRESSURE: 112 MMHG | BODY MASS INDEX: 25.87 KG/M2 | WEIGHT: 146 LBS | HEART RATE: 70 BPM | HEIGHT: 63 IN | OXYGEN SATURATION: 100 % | TEMPERATURE: 98 F

## 2024-10-02 DIAGNOSIS — Z23 IMMUNIZATION DUE: ICD-10-CM

## 2024-10-02 DIAGNOSIS — Z3A.14 14 WEEKS GESTATION OF PREGNANCY: Primary | ICD-10-CM

## 2024-10-02 LAB
BILIRUB SERPL-MCNC: NEGATIVE MG/DL
BLOOD URINE, POC: NEGATIVE
CLARITY, POC UA: NORMAL
COLOR, POC UA: NORMAL
GLUCOSE UR QL STRIP: NEGATIVE
KETONES UR QL STRIP: NORMAL
LEUKOCYTE ESTERASE URINE, POC: NORMAL
NITRITE, POC UA: NEGATIVE
PH, POC UA: 7
PROTEIN, POC: NORMAL
SPECIFIC GRAVITY, POC UA: 1.02
UROBILINOGEN, POC UA: NORMAL

## 2024-10-02 PROCEDURE — 99215 OFFICE O/P EST HI 40 MIN: CPT | Mod: PBBFAC

## 2024-10-02 PROCEDURE — 36415 COLL VENOUS BLD VENIPUNCTURE: CPT

## 2024-10-02 PROCEDURE — 90471 IMMUNIZATION ADMIN: CPT | Mod: PBBFAC

## 2024-10-02 PROCEDURE — 81002 URINALYSIS NONAUTO W/O SCOPE: CPT | Mod: PBBFAC

## 2024-10-02 PROCEDURE — 90656 IIV3 VACC NO PRSV 0.5 ML IM: CPT | Mod: PBBFAC

## 2024-10-02 RX ADMIN — INFLUENZA VIRUS VACCINE 0.5 ML: 15; 15; 15 SUSPENSION INTRAMUSCULAR at 11:10

## 2024-10-02 NOTE — PROGRESS NOTES
Faculty Attestation    I have reveiwed and agree with the resident's findings, including all diagnostic interpretations and plans as written.    Jo Jiménez MD

## 2024-10-02 NOTE — PROGRESS NOTES
OB Office Visit Note    Name: iTff Carrero  MRN: [id number]  Date: 10/02/2024    Subjective:      Chief Complaint: Follow-up (Ob f/u 14w3d)      Tiff Carrero is a 33 y.o.  at 14w3d with JOSE RAMON 3/30/2025, by Ultrasound presenting for routine OB visit.     Current issues: has no unusual complaints. Denies vaginal bleeding, discharge, abdominal pain.     Chronic issues: None    PMHx: None  PSHx: D&C after spontaneous miscarriage (2024)  SH: support at home, no tobacco use, and no illicit drug use  FHx: No reported pertinent FHx  Meds:   Prior to Admission medications    Not on File     Allergies: Review of patient's allergies indicates:  No Known Allergies    Gestational History:   OB History    Para Term  AB Living   7 4 4 0 2 4   SAB IAB Ectopic Multiple Live Births   2 0 0 0 4      # Outcome Date GA Lbr Myke/2nd Weight Sex Type Anes PTL Lv   7 Current            6 SAB 2024 6w0d    SAB None  FD   5 SAB 2023 8w0d    SAB None  FD   4 Term 14 40w0d  3.629 kg (8 lb) M Vag-Spont None N RAKESH   3 Term 12 40w0d  3.175 kg (7 lb) F Vag-Spont None N RAKESH   2 Term 10/07/09 40w0d  3.175 kg (7 lb) M Vag-Spont None N RAKESH   1 Term 05 40w0d  3.175 kg (7 lb) M Vag-Spont None N RAKESH       GYNHx:   LMP: Patient's last menstrual period was 2024 (exact date).   Menarche at 13  Hx of birth control: OCP (estrogen/progesterone), Depo-Provera injections, and Nexplanon previously    Hx of STDs: none   History of Abnormal PAP: Yes - ASCUS with +OHRHPV (2024). NIL with -HPV on 2024      Antepartum specific ROS  - Fetal movements: No  - Vaginal bleeding: No  - Vaginal discharge: No  - Loss of fluid: No  - Contractions: No  - Headaches: No  - Vision changes: No  - Edema: No    Review of Systems  Constitutional: no fever, no chills  CV: no chest pain  RESP: no SOB  : no dysuria, no hematuria  GI: no constipation, no diarrhea, no nausea, no vomiting  Psych: no  "depression, no anxiety; No SI/HI    Objective:      Vitals:    10/02/24 1021   BP: 112/72   BP Location: Right arm   Patient Position: Sitting   Pulse: 70   Resp: 18   Temp: 98.2 °F (36.8 °C)   TempSrc: Oral   SpO2: 100%   Weight: 66.2 kg (146 lb)   Height: 5' 3" (1.6 m)       Lab Results   Component Value Date    COLORU Colorless (A) 09/05/2024    SPECGRAV 1.020 10/02/2024    PHUR 7.0 10/02/2024    WBCUR TRACE 10/02/2024    NITRITE NEGATIVE 10/02/2024    PROTEINPOC 30 MG/dL 10/02/2024    GLUCOSEUR NEGATIVE 10/02/2024    KETONESU 15 MG/dL 10/02/2024    UROBILINOGEN 1.0 E.U./dL 10/02/2024    BILIRUBINPOC NEGATIVE 10/02/2024    RBCUR NEGATIVE 10/02/2024       General:   RESP: clear to auscultation bilaterally, non labored  CV: regular rate and rhythm, no murmurs, no edema  ABD: gravid, nontender, BS+ FHT present  FHTs: 140s bpm; (suprapubic)  Fundal height: level of umbilicus     Initial OB Labs: Ordered 9/5/24  - Blood Type and Rh: O+   - Antibody Screen: Neg  - CBC H/H: 11.0/34.3  - HIV: NR  - RPR: NR  - GC: Neg  - CT: Neg  - HBsAg: NR  - HCVAb: NR  - Rubella: Immune  - Varicella: Immune  - UA & Culture: NG  - Sickle Cell Screen: Neg  - PAP: NIL, HRHPV-  - Influenza vaccine date: 10/2/24    15-20 Weeks: Lab Ordered 10/2/24  - Quad Screen:     - 20 wk anatomy US:    28 Week Lab: Ordered   - 1H GTT:   - Rhogam:   - Date of Tdap:   - CBC H/H:   - RPR:   - BTL consent:     36 Week Lab: Ordered -  - CBC H/H:   - RPR:   - GBS Culture:   - HIV:   - Cervical GC:     Imaging:  Initial OB US 9/5/24: Single viable intrauterine pregnancy with a crown-rump length indicative of a 10 week 4 day gestation. Small subchorionic bleed measuring 1.3 x 0.6 x 1.3 cm.        Assessment/Plan:     Tiff was seen today for follow-up.    Diagnoses and all orders for this visit:    14 weeks gestation of pregnancy  -     POCT URINE DIPSTICK WITHOUT MICROSCOPE  -     Quad Screen Maternal, Serum    Immunization due  -     influenza (Flulaval, " Fluzone, Fluarix) 45 mcg/0.5 mL IM vaccine (> or = 6 mo) 0.5 mL    Other orders  -     PNV,calcium 72-iron-folic acid (PRENATAL VITAMIN PLUS LOW IRON) 27 mg iron- 1 mg Tab; Take 1 tablet (1 each total) by mouth once daily.         Pregnancy  - PNVs  - Urine dip reviewed as above  - Routine (initial) labs: as mentioned above/pending  - Mother plans to breast and/or bottlefeed  - Postpartum contraception discussion: PENDING  - ED precautions discussed in depth: vaginal bleeding or leaking fluid, belly cramping or pain, SOB/chest pain, swelling of the face/lower extremities, vision changes. If don't feel the baby move in over an hour. Severe headache that are not resolved with medication  - Quad screen today   - Pt to be scheduled for 20 week anatomy scan. RN messaged.  - Received flu vaccine today     Return to clinic in Follow up in about 4 weeks (around 10/30/2024) for OB.    Shelia Andrade DO  [employer] [facility], HO-II

## 2024-10-07 DIAGNOSIS — Z3A.15 15 WEEKS GESTATION OF PREGNANCY: Primary | ICD-10-CM

## 2024-10-17 ENCOUNTER — PATIENT MESSAGE (OUTPATIENT)
Dept: RESEARCH | Facility: HOSPITAL | Age: 34
End: 2024-10-17

## 2024-10-30 ENCOUNTER — OFFICE VISIT (OUTPATIENT)
Dept: FAMILY MEDICINE | Facility: CLINIC | Age: 34
End: 2024-10-30

## 2024-10-30 VITALS
HEART RATE: 71 BPM | DIASTOLIC BLOOD PRESSURE: 74 MMHG | OXYGEN SATURATION: 100 % | SYSTOLIC BLOOD PRESSURE: 120 MMHG | BODY MASS INDEX: 26.96 KG/M2 | WEIGHT: 152.19 LBS | TEMPERATURE: 98 F | HEIGHT: 63 IN | RESPIRATION RATE: 18 BRPM

## 2024-10-30 DIAGNOSIS — Z3A.18 18 WEEKS GESTATION OF PREGNANCY: Primary | ICD-10-CM

## 2024-10-30 DIAGNOSIS — R87.610 ASCUS WITH POSITIVE HIGH RISK HPV CERVICAL: ICD-10-CM

## 2024-10-30 DIAGNOSIS — R87.810 ASCUS WITH POSITIVE HIGH RISK HPV CERVICAL: ICD-10-CM

## 2024-10-30 LAB
BILIRUB SERPL-MCNC: NEGATIVE MG/DL
BLOOD URINE, POC: NEGATIVE
CLARITY, POC UA: NORMAL
COLOR, POC UA: YELLOW
GLUCOSE UR QL STRIP: NEGATIVE
KETONES UR QL STRIP: NEGATIVE
LEUKOCYTE ESTERASE URINE, POC: NORMAL
NITRITE, POC UA: NEGATIVE
PH, POC UA: 6.5
PROTEIN, POC: NEGATIVE
SPECIFIC GRAVITY, POC UA: 1.01
UROBILINOGEN, POC UA: NORMAL

## 2024-10-30 PROCEDURE — 81002 URINALYSIS NONAUTO W/O SCOPE: CPT | Mod: PBBFAC

## 2024-10-30 PROCEDURE — 81511 FTL CGEN ABNOR FOUR ANAL: CPT

## 2024-10-30 PROCEDURE — 36415 COLL VENOUS BLD VENIPUNCTURE: CPT

## 2024-10-30 PROCEDURE — 99214 OFFICE O/P EST MOD 30 MIN: CPT | Mod: PBBFAC

## 2024-10-31 LAB
# FETUSES: NORMAL
2ND TRIMESTER 4 SCREEN SERPL-IMP: NORMAL
AFP ADJ MOM SERPL: 0.93 MOM
AFP SERPL IA-MCNC: 42.5 NG/ML
AGE AT DELIVERY: NORMAL
B-HCG ADJ MOM SERPL: 0.49 MOM
CHORION TYPE: NORMAL
COLLECT DATE: NORMAL
CURRENT SMOKER: NORMAL
FET TS 21 RISK FROM MAT AGE: NORMAL
GA METHOD: NORMAL
GA US.COMPOSITE.EST: NORMAL WK,D
HCG SERPL IA-ACNC: 11.1 IU/ML
HX OF NTD QL: NO
HX OF NTD QL: NO
HX OF TRISOMY 21 QL: NO
IDDM PATIENT QL: NO
INHIBIN A ADJ MOM SERPL: 0.46 MOM
INHIBIN SERPL-MCNC: 72 PG/ML
IVF PREGNANCY: NO
LABORATORY COMMENT REPORT: NORMAL
M PHYSICIAN PHONE NUMBER: NORMAL
MATERNAL RISK FACTORS: NORMAL
NEURAL TUBE DEFECT RISK FETUS: NORMAL %
RECOM F/U: NORMAL
TEST PERFORMANCE INFO SPEC: NORMAL
TS 18 RISK FETUS: NORMAL
TS 21 RISK FETUS: NORMAL
U ESTRIOL ADJ MOM SERPL: 1.64 MOM
U ESTRIOL SERPL-MCNC: 2.69 NG/ML

## 2024-11-12 ENCOUNTER — PROCEDURE VISIT (OUTPATIENT)
Dept: MATERNAL FETAL MEDICINE | Facility: CLINIC | Age: 34
End: 2024-11-12

## 2024-11-12 DIAGNOSIS — Z36.89 ENCOUNTER FOR FETAL ANATOMIC SURVEY: ICD-10-CM

## 2024-11-12 DIAGNOSIS — Z3A.15 15 WEEKS GESTATION OF PREGNANCY: ICD-10-CM

## 2024-11-12 DIAGNOSIS — Z3A.15 15 WEEKS GESTATION OF PREGNANCY: Primary | ICD-10-CM

## 2024-11-12 PROCEDURE — 76811 OB US DETAILED SNGL FETUS: CPT | Mod: S$GLB,,, | Performed by: OBSTETRICS & GYNECOLOGY

## 2024-11-26 NOTE — PROGRESS NOTES
[facility] OB OFFICE VISIT NOTE  Tiff Carrero  34 y.o.  [id number]  2024      Chief Complaint: Routine Prenatal Visit      Tiff Carrero is a 34 y.o.  female 22w2d by  first trimester ultrasound (JOSE RAMON Estimated Date of Delivery: 3/30/25) presenting to [facility] for routine prenatal visit.    Current Issues: No concern. Doing well. Taking PNV daily. Feeling fetal movement. Denies HA, vision change, N/V, vaginal bleeding/discharge.       OB History    Para Term  AB Living   7 4 4 0 2 4   SAB IAB Ectopic Multiple Live Births   2 0 0 0 4      # Outcome Date GA Lbr Myke/2nd Weight Sex Type Anes PTL Lv   7 Current            6 2024 6w0d    SAB None  FD   5 SAB 2023 8w0d    SAB None  FD   4 Term 14 40w0d  3.629 kg (8 lb) M Vag-Spont None N RAKESH   3 Term 12 40w0d  3.175 kg (7 lb) F Vag-Spont None N RAKESH   2 Term 10/07/09 40w0d  3.175 kg (7 lb) M Vag-Spont None N RAKESH   1 Term 05 40w0d  3.175 kg (7 lb) M Vag-Spont None N RAKESH       Gyn History:   LMP: Patient's last menstrual period was 2024 (exact date).  Age at menarche: 13 years  Menstrual hx: regular, 30 day cycles, 4-5 pads/day, 3-4 days per period  History of birth control: OCP (estrogen/progesterone), Depo-Provera and Nexplanon  History of STDs: None  Abnormal PAPs: ASCUS w/ pos HR-HPV (2024), NIL with neg HPV (2024)  History of prior : No    Past Medical History: Anemia  Surgical History: D&C post spontaneous miscarriage (2024)  Family History: Non-contributory   Social History: No smoking, No drinking, No recreational drug use    Medications:  See list below  Current Outpatient Medications   Medication Instructions    PNV,calcium 72-iron-folic acid (PRENATAL VITAMIN PLUS LOW IRON) 27 mg iron- 1 mg Tab 1 each, Oral, Daily        Review of Systems  As per HPI    Antepartum specific   - Fetal movements: Yes  - Vaginal bleeding: No  - Vaginal discharge: No  - Loss of  fluid: No  - Contractions: No  - Headaches: No  - Vision changes: No  - Edema: No    Blood pressure 98/64, pulse 71, temperature 98.1 °F (36.7 °C), temperature source Oral, weight 70.5 kg (155 lb 6.4 oz), last menstrual period 06/14/2024, SpO2 100%.     Physical Exam   General: in no acute distress  RESP: clear to auscultation bilaterally, non labored  CV: regular rate and rhythm, no murmurs, no edema  ABD: gravid, nontender, BS+  FHTs: 153 bpm by Doppler  Fundal height: 22 cm    Current Medications:   Current Outpatient Medications   Medication Sig Dispense Refill    PNV,calcium 72-iron-folic acid (PRENATAL VITAMIN PLUS LOW IRON) 27 mg iron- 1 mg Tab Take 1 tablet (1 each total) by mouth once daily. 30 tablet 11     No current facility-administered medications for this visit.       Labs:  Urine dipstick:   Lab Results   Component Value Date    COLORU Yellow 11/27/2024    SPECGRAV 1.010 11/27/2024    PHUR 6.0 11/27/2024    WBCUR negative 11/27/2024    NITRITE negative 11/27/2024    PROTEINPOC negative 11/27/2024    GLUCOSEUR negative 11/27/2024    KETONESU negative 11/27/2024    UROBILINOGEN 0.2 E.U./dL 11/27/2024    BILIRUBINPOC negative 11/27/2024    RBCUR negative 11/27/2024       Initial OB Labs: (9/5/2024)  - Blood Type and Rh: O+  - Antibody Screen: Neg  - CBC H/H: 11.0/34.3  - HIV: NR  - Syphilis Ab: NR  - GC: Neg  - CT: Neg  - HBsAg: NR  - HCVAb: NR  - Rubella: Immune  - Varicella: Immune  - UA & Culture: NG  - Sickle Cell Screen: Neg  - PAP: NIL  - Influenza vaccine date: 10/2/2024  - BTL desired: No desire     15-20 Weeks Lab: (10/2/2024)  - Quad Screen: Normal risk    28 Week Lab  - 1H GTT:   - Rhogam:   - Date of Tdap:   - CBC H/H:   - Syphilis Ab:   - BTL consent:     36 Week Lab  - CBC H/H:   - Syphilis Ab:   - GBS Culture:   - HIV:   - Cervical GC:   - Cervical CT:     Imaging:   Initial US 9/5/2024: Single viable intrauterine pregnancy with a crown-rump length indicative of a 10 week 4 day gestation.  Small subchorionic bleed measuring 1.3 x 0.6 x 1.3 cm.     20 Week anatomy US: Result pending  Patient states US not good quality, she will need to repeat US on 12/10/2024    Assessment/Plan:     22 weeks gestation of pregnancy  OB Protocol, PNVs   Urine dip reviewed as above  Mother plans to breast and/or bottled-feed: Both  Postpartum contraception discussion: PENDING  FM Continuity patient after delivery: Yes  Labor precautions discussed in depth; if vaginal bleeding or leaking fluid, belly cramping or pain, shortness of breath-chest pain, swelling of the face-hands, ankles and feet or legs. If don't feel the baby move in over an hour. Change in vision. Severe headache that are not resolved with medication    Hx of ASCUS, HR HPV+ (other)  Has postpartum Gyn appt 6/16/25 for colposcopy    Follow-up  Return to clinic in 4 weeks for prenatal care      Yonatan Sanchez MD  [facility] Family Medicine Resident HO-1  11/27/2024

## 2024-11-27 ENCOUNTER — OFFICE VISIT (OUTPATIENT)
Dept: FAMILY MEDICINE | Facility: CLINIC | Age: 34
End: 2024-11-27
Payer: MEDICAID

## 2024-11-27 VITALS
SYSTOLIC BLOOD PRESSURE: 98 MMHG | BODY MASS INDEX: 27.53 KG/M2 | OXYGEN SATURATION: 100 % | DIASTOLIC BLOOD PRESSURE: 64 MMHG | HEART RATE: 71 BPM | TEMPERATURE: 98 F | WEIGHT: 155.38 LBS

## 2024-11-27 DIAGNOSIS — Z3A.22 22 WEEKS GESTATION OF PREGNANCY: Primary | ICD-10-CM

## 2024-11-27 LAB
BILIRUB SERPL-MCNC: NEGATIVE MG/DL
BLOOD URINE, POC: NEGATIVE
CLARITY, POC UA: NORMAL
COLOR, POC UA: YELLOW
GLUCOSE UR QL STRIP: NEGATIVE
KETONES UR QL STRIP: NEGATIVE
LEUKOCYTE ESTERASE URINE, POC: NEGATIVE
NITRITE, POC UA: NEGATIVE
PH, POC UA: 6
PROTEIN, POC: NEGATIVE
SPECIFIC GRAVITY, POC UA: 1.01
UROBILINOGEN, POC UA: NORMAL

## 2024-11-27 PROCEDURE — 81002 URINALYSIS NONAUTO W/O SCOPE: CPT | Mod: PBBFAC

## 2024-11-27 PROCEDURE — 99213 OFFICE O/P EST LOW 20 MIN: CPT | Mod: PBBFAC

## 2024-11-27 NOTE — PROGRESS NOTES
I have seen the patient, reviewed the resident's history and physical, assessment, plan, and progress note. I have personally interviewed and examined the patient at bedside and: agree with the findings.     Dio Ornelas MD  [facility]

## 2024-12-10 ENCOUNTER — PROCEDURE VISIT (OUTPATIENT)
Dept: MATERNAL FETAL MEDICINE | Facility: CLINIC | Age: 34
End: 2024-12-10
Payer: MEDICAID

## 2024-12-10 DIAGNOSIS — Z36.89 ENCOUNTER FOR FETAL ANATOMIC SURVEY: ICD-10-CM

## 2024-12-10 DIAGNOSIS — Z36.89 ENCOUNTER FOR FETAL ANATOMIC SURVEY: Primary | ICD-10-CM

## 2024-12-10 PROCEDURE — 76816 OB US FOLLOW-UP PER FETUS: CPT | Mod: S$GLB,,, | Performed by: OBSTETRICS & GYNECOLOGY

## 2024-12-26 ENCOUNTER — OFFICE VISIT (OUTPATIENT)
Dept: FAMILY MEDICINE | Facility: CLINIC | Age: 34
End: 2024-12-26
Payer: MEDICAID

## 2024-12-26 VITALS
OXYGEN SATURATION: 100 % | BODY MASS INDEX: 28.91 KG/M2 | HEIGHT: 63 IN | HEART RATE: 75 BPM | TEMPERATURE: 98 F | SYSTOLIC BLOOD PRESSURE: 114 MMHG | WEIGHT: 163.19 LBS | DIASTOLIC BLOOD PRESSURE: 75 MMHG

## 2024-12-26 DIAGNOSIS — Z3A.26 26 WEEKS GESTATION OF PREGNANCY: Primary | ICD-10-CM

## 2024-12-26 LAB
BILIRUB SERPL-MCNC: NORMAL MG/DL
BLOOD URINE, POC: NORMAL
CLARITY, POC UA: CLEAR
COLOR, POC UA: YELLOW
GLUCOSE UR QL STRIP: NORMAL
KETONES UR QL STRIP: NORMAL
LEUKOCYTE ESTERASE URINE, POC: NORMAL
NITRITE, POC UA: NORMAL
PH, POC UA: 6
PROTEIN, POC: NORMAL
SPECIFIC GRAVITY, POC UA: 1.01
UROBILINOGEN, POC UA: 0.2

## 2024-12-26 PROCEDURE — 99213 OFFICE O/P EST LOW 20 MIN: CPT | Mod: PBBFAC

## 2024-12-26 NOTE — PROGRESS NOTES
[facility] OB OFFICE VISIT NOTE  Tiff Carrero  34 y.o.  [id number]  2024      Chief Complaint: Routine Prenatal Visit      Tiff Carrero is a 34 y.o.  female 22w2d by  first trimester ultrasound (JOSE RAMON Estimated Date of Delivery: 3/30/25) presenting to [facility] for routine prenatal visit.    Current Issues: concern over increased fluid seen on fetal US. No prior hx of gestational diabetes. Doing well. Taking PNV daily. Denies chest pain, abdominal pain, headaches, or vaginal bleeding    OB History    Para Term  AB Living   7 4 4 0 2 4   SAB IAB Ectopic Multiple Live Births   2 0 0 0 4      # Outcome Date GA Lbr Myke/2nd Weight Sex Type Anes PTL Lv   7 Current            6 2024 6w0d    SAB None  FD   5 SAB 2023 8w0d    SAB None  FD   4 Term 14 40w0d  3.629 kg (8 lb) M Vag-Spont None N RAKESH   3 Term 12 40w0d  3.175 kg (7 lb) F Vag-Spont None N RAKESH   2 Term 10/07/09 40w0d  3.175 kg (7 lb) M Vag-Spont None N RAKESH   1 Term 05 40w0d  3.175 kg (7 lb) M Vag-Spont None N RAKESH       Gyn History:   LMP: Patient's last menstrual period was 2024 (exact date).  Age at menarche: 13 years  Menstrual hx: regular, 30 day cycles, 4-5 pads/day, 3-4 days per period  History of birth control: OCP (estrogen/progesterone), Depo-Provera and Nexplanon  History of STDs: None  Abnormal PAPs: ASCUS w/ pos HR-HPV (2024), NIL with neg HPV (2024)  History of prior : No    Past Medical History: Anemia  Surgical History: D&C post spontaneous miscarriage (2024)  Family History: Non-contributory   Social History: No smoking, No drinking, No recreational drug use    Medications:  See list below  Current Outpatient Medications   Medication Instructions    PNV,calcium 72-iron-folic acid (PRENATAL VITAMIN PLUS LOW IRON) 27 mg iron- 1 mg Tab 1 each, Oral, Daily        Review of Systems  As per HPI    Antepartum specific   - Fetal movements: Yes  - Vaginal  "bleeding: No  - Vaginal discharge: No  - Loss of fluid: No  - Contractions: No  - Headaches: No  - Vision changes: No  - Edema: No    Blood pressure 114/75, pulse 75, temperature 97.6 °F (36.4 °C), temperature source Oral, height 5' 3" (1.6 m), weight 74 kg (163 lb 3.2 oz), last menstrual period 06/14/2024, SpO2 100%.     Physical Exam   General: in no acute distress  RESP: clear to auscultation bilaterally, non labored  CV: regular rate and rhythm, no murmurs, no edema  ABD: gravid, nontender, BS+  FHTs: 130s bpm by Doppler  Fundal height: 27 cm    Current Medications:   Current Outpatient Medications   Medication Sig Dispense Refill    PNV,calcium 72-iron-folic acid (PRENATAL VITAMIN PLUS LOW IRON) 27 mg iron- 1 mg Tab Take 1 tablet (1 each total) by mouth once daily. 30 tablet 11     No current facility-administered medications for this visit.       Labs:  Urine dipstick:   Lab Results   Component Value Date    COLORU Yellow 12/26/2024    SPECGRAV 1.010 12/26/2024    PHUR 6.0 12/26/2024    WBCUR NEG 12/26/2024    NITRITE NEG 12/26/2024    PROTEINPOC NEG 12/26/2024    GLUCOSEUR NEG 12/26/2024    KETONESU NEG 12/26/2024    UROBILINOGEN 0.2 12/26/2024    BILIRUBINPOC NEG 12/26/2024    RBCUR NEG 12/26/2024       Initial OB Labs: (9/5/2024)  - Blood Type and Rh: O+  - Antibody Screen: Neg  - CBC H/H: 11.0/34.3  - HIV: NR  - Syphilis Ab: NR  - GC: Neg  - CT: Neg  - HBsAg: NR  - HCVAb: NR  - Rubella: Immune  - Varicella: Immune  - UA & Culture: NG  - Sickle Cell Screen: Neg  - PAP: NIL  - Influenza vaccine date: 10/2/2024  - BTL desired: No desire     15-20 Weeks Lab: (10/2/2024)  - Quad Screen: Normal risk    28 Week Lab  - 1H GTT:   - Rhogam:   - Date of Tdap:   - CBC H/H:   - Syphilis Ab:   - BTL consent:     36 Week Lab  - CBC H/H:   - Syphilis Ab:   - GBS Culture:   - HIV:   - Cervical GC:   - Cervical CT:     Imaging:   Initial US 9/5/2024: Single viable intrauterine pregnancy with a crown-rump length indicative " of a 10 week 4 day gestation. Small subchorionic bleed measuring 1.3 x 0.6 x 1.3 cm.     20 Week anatomy US: 12/10/24  A viable bar pregnancy is visualized in cephalic presentation. Estimated fetal weight is at the 70th percentile with an abdominal circumference at the 97th percentile   (growth is accelerated). No fetal abnormalities are noted and anatomic survey is complete. Amniotic fluid volume is mildly increased (borderline polyhydramnios). Placenta   is left, lateral, fundal.   Results briefly discussed with the patient. Please complete glucola.    We recommend evaluation in 8 weeks.     Assessment/Plan:     26 weeks gestation of pregnancy  OB Protocol, PNVs   Urine dip reviewed as above  Mother plans to breast and/or bottled-feed: Both  Postpartum contraception discussion: PENDING  FM Continuity patient after delivery: Yes  Labor precautions discussed in depth; if vaginal bleeding or leaking fluid, belly cramping or pain, shortness of breath-chest pain, swelling of the face-hands, ankles and feet or legs. If don't feel the baby move in over an hour. Change in vision. Severe headache that are not resolved with medication    Hx of ASCUS, HR HPV+ (other)  Has postpartum Gyn appt 6/16/25 for colposcopy    Follow-up  Return to clinic in 2 weeks for prenatal care      Rosette Hardin DO  [facility] Family Medicine Resident, \[facility] [place] [facility]\""

## 2025-01-09 ENCOUNTER — OFFICE VISIT (OUTPATIENT)
Dept: FAMILY MEDICINE | Facility: CLINIC | Age: 35
End: 2025-01-09
Payer: MEDICAID

## 2025-01-09 VITALS
BODY MASS INDEX: 29.41 KG/M2 | RESPIRATION RATE: 20 BRPM | TEMPERATURE: 98 F | OXYGEN SATURATION: 100 % | HEIGHT: 63 IN | HEART RATE: 73 BPM | DIASTOLIC BLOOD PRESSURE: 78 MMHG | WEIGHT: 166 LBS | SYSTOLIC BLOOD PRESSURE: 117 MMHG

## 2025-01-09 DIAGNOSIS — Z3A.28 28 WEEKS GESTATION OF PREGNANCY: Primary | ICD-10-CM

## 2025-01-09 LAB
BASOPHILS # BLD AUTO: 0.03 X10(3)/MCL
BASOPHILS NFR BLD AUTO: 0.3 %
BILIRUB SERPL-MCNC: NEGATIVE MG/DL
BLOOD URINE, POC: NEGATIVE
CLARITY, POC UA: CLEAR
COLOR, POC UA: NORMAL
EOSINOPHIL # BLD AUTO: 0.07 X10(3)/MCL (ref 0–0.9)
EOSINOPHIL NFR BLD AUTO: 0.8 %
ERYTHROCYTE [DISTWIDTH] IN BLOOD BY AUTOMATED COUNT: 13.7 % (ref 11.5–17)
GLUCOSE 1H P 100 G GLC PO SERPL-MCNC: 108 MG/DL (ref 100–180)
GLUCOSE UR QL STRIP: NEGATIVE
HCT VFR BLD AUTO: 30.1 % (ref 37–47)
HGB BLD-MCNC: 9.5 G/DL (ref 12–16)
IMM GRANULOCYTES # BLD AUTO: 0.06 X10(3)/MCL (ref 0–0.04)
IMM GRANULOCYTES NFR BLD AUTO: 0.6 %
KETONES UR QL STRIP: NEGATIVE
LEUKOCYTE ESTERASE URINE, POC: NEGATIVE
LYMPHOCYTES # BLD AUTO: 1.83 X10(3)/MCL (ref 0.6–4.6)
LYMPHOCYTES NFR BLD AUTO: 19.7 %
MCH RBC QN AUTO: 24.7 PG (ref 27–31)
MCHC RBC AUTO-ENTMCNC: 31.6 G/DL (ref 33–36)
MCV RBC AUTO: 78.2 FL (ref 80–94)
MONOCYTES # BLD AUTO: 0.46 X10(3)/MCL (ref 0.1–1.3)
MONOCYTES NFR BLD AUTO: 5 %
NEUTROPHILS # BLD AUTO: 6.83 X10(3)/MCL (ref 2.1–9.2)
NEUTROPHILS NFR BLD AUTO: 73.6 %
NITRITE, POC UA: NEGATIVE
NRBC BLD AUTO-RTO: 0 %
PH, POC UA: 6.5
PLATELET # BLD AUTO: 385 X10(3)/MCL (ref 130–400)
PMV BLD AUTO: 10.1 FL (ref 7.4–10.4)
PROTEIN, POC: NEGATIVE
RBC # BLD AUTO: 3.85 X10(6)/MCL (ref 4.2–5.4)
SPECIFIC GRAVITY, POC UA: 1.01
T PALLIDUM AB SER QL: NONREACTIVE
UROBILINOGEN, POC UA: NORMAL
WBC # BLD AUTO: 9.28 X10(3)/MCL (ref 4.5–11.5)

## 2025-01-09 PROCEDURE — 82950 GLUCOSE TEST: CPT

## 2025-01-09 PROCEDURE — 90715 TDAP VACCINE 7 YRS/> IM: CPT | Mod: PBBFAC

## 2025-01-09 PROCEDURE — 85025 COMPLETE CBC W/AUTO DIFF WBC: CPT

## 2025-01-09 PROCEDURE — 99213 OFFICE O/P EST LOW 20 MIN: CPT | Mod: PBBFAC

## 2025-01-09 PROCEDURE — 36415 COLL VENOUS BLD VENIPUNCTURE: CPT

## 2025-01-09 PROCEDURE — 81002 URINALYSIS NONAUTO W/O SCOPE: CPT | Mod: PBBFAC

## 2025-01-09 PROCEDURE — 90471 IMMUNIZATION ADMIN: CPT | Mod: PBBFAC

## 2025-01-09 PROCEDURE — 86780 TREPONEMA PALLIDUM: CPT

## 2025-01-09 RX ADMIN — TETANUS TOXOID, REDUCED DIPHTHERIA TOXOID AND ACELLULAR PERTUSSIS VACCINE, ADSORBED 0.5 ML: 5; 2.5; 8; 8; 2.5 SUSPENSION INTRAMUSCULAR at 01:01

## 2025-01-09 NOTE — PROGRESS NOTES
[facility] OB OFFICE VISIT NOTE  Tiff Carrero  34 y.o.  [id number]  2025      Chief Complaint: OB follow-up    Tiff Carrero is a 34 y.o.  female 28w4d by  first trimester ultrasound (JOSE RAMON Estimated Date of Delivery: 3/30/25) presenting to [facility] for routine prenatal visit.    Current Issues: No concerns today. Doing well. Taking PNV daily. Denies chest pain, abdominal pain, headaches, or vaginal bleeding    OB History    Para Term  AB Living   7 4 4 0 2 4   SAB IAB Ectopic Multiple Live Births   2 0 0 0 4      # Outcome Date GA Lbr Myke/2nd Weight Sex Type Anes PTL Lv   7 Current            6 2024 6w0d    SAB None  FD   5 SAB 2023 8w0d    SAB None  FD   4 Term 14 40w0d  3.629 kg (8 lb) M Vag-Spont None N RAKESH   3 Term 12 40w0d  3.175 kg (7 lb) F Vag-Spont None N RAKESH   2 Term 10/07/09 40w0d  3.175 kg (7 lb) M Vag-Spont None N RAKESH   1 Term 05 40w0d  3.175 kg (7 lb) M Vag-Spont None N RAKESH       Gyn History:   LMP: Patient's last menstrual period was 2024 (exact date).  Age at menarche: 13 years  Menstrual hx: regular, 30 day cycles, 4-5 pads/day, 3-4 days per period  History of birth control: OCP (estrogen/progesterone), Depo-Provera and Nexplanon  History of STDs: None  Abnormal PAPs: ASCUS w/ pos HR-HPV (2024), NIL with neg HPV (2024)  History of prior : No    Past Medical History: Anemia  Surgical History: D&C post spontaneous miscarriage (2024)  Family History: Non-contributory   Social History: No smoking, No drinking, No recreational drug use    Medications:  See list below  Current Outpatient Medications   Medication Instructions    PNV,calcium 72-iron-folic acid (PRENATAL VITAMIN PLUS LOW IRON) 27 mg iron- 1 mg Tab 1 each, Oral, Daily        Review of Systems  As per HPI    Antepartum specific   - Fetal movements: Yes  - Vaginal bleeding: No  - Vaginal discharge: No  - Loss of fluid: No  - Contractions:  No  - Headaches: No  - Vision changes: No  - Edema: No    Last menstrual period 06/14/2024.     Physical Exam   General: in no acute distress  RESP: clear to auscultation bilaterally, non labored  CV: regular rate and rhythm, no murmurs, no edema  ABD: gravid, nontender, BS+  FHTs: 130s bpm by Doppler  Fundal height: 28.5 cm    Current Medications:   Current Outpatient Medications   Medication Sig Dispense Refill    PNV,calcium 72-iron-folic acid (PRENATAL VITAMIN PLUS LOW IRON) 27 mg iron- 1 mg Tab Take 1 tablet (1 each total) by mouth once daily. 30 tablet 11     No current facility-administered medications for this visit.       Labs:  Urine dipstick:   Lab Results   Component Value Date    COLORU Yellow 12/26/2024    SPECGRAV 1.010 12/26/2024    PHUR 6.0 12/26/2024    WBCUR NEG 12/26/2024    NITRITE NEG 12/26/2024    PROTEINPOC NEG 12/26/2024    GLUCOSEUR NEG 12/26/2024    KETONESU NEG 12/26/2024    UROBILINOGEN 0.2 12/26/2024    BILIRUBINPOC NEG 12/26/2024    RBCUR NEG 12/26/2024       Initial OB Labs: (9/5/2024)  - Blood Type and Rh: O+  - Antibody Screen: Neg  - CBC H/H: 11.0/34.3  - HIV: NR  - Syphilis Ab: NR  - GC: Neg  - CT: Neg  - HBsAg: NR  - HCVAb: NR  - Rubella: Immune  - Varicella: Immune  - UA & Culture: NG  - Sickle Cell Screen: Neg  - PAP: NIL  - Influenza vaccine date: 10/2/2024  - BTL desired: No desire     15-20 Weeks Lab: (10/2/2024)  - Quad Screen: Normal risk    28 Week Lab 1/9/25  - 1H GTT: 108  - Rhogam: N/A  - Date of Tdap: 1/9/25   - CBC H/H: 9.5/30.1  - Syphilis Ab: pending  - BTL consent: n/a    36 Week Lab  - CBC H/H:   - Syphilis Ab:   - GBS Culture:   - HIV:   - Cervical GC:   - Cervical CT:     Imaging:   Initial US 9/5/2024: Single viable intrauterine pregnancy with a crown-rump length indicative of a 10 week 4 day gestation. Small subchorionic bleed measuring 1.3 x 0.6 x 1.3 cm.     20 Week anatomy US: 12/10/24  A viable bar pregnancy is visualized in cephalic presentation.  Estimated fetal weight is at the 70th percentile with an abdominal circumference at the 97th percentile   (growth is accelerated). No fetal abnormalities are noted and anatomic survey is complete. Amniotic fluid volume is mildly increased (borderline polyhydramnios). Placenta   is left, lateral, fundal.   Results briefly discussed with the patient. Please complete glucola.    We recommend evaluation in 8 weeks.     Assessment/Plan:     28 weeks gestation of pregnancy  OB Protocol, PNVs   Urine dip reviewed as above  Mother plans to breast and/or bottled-feed: Both  Postpartum contraception discussion: nexplanon  FM Continuity patient after delivery: yes  Labor precautions discussed in depth; if vaginal bleeding or leaking fluid, belly cramping or pain, shortness of breath-chest pain, swelling of the face-hands, ankles and feet or legs. If don't feel the baby move in over an hour. Change in vision. Severe headache that are not resolved with medication  Will need iron panel at next visit to evaluate anemia and Rx for additional iron as needed    Hx of ASCUS, HR HPV+ (other)  Has postpartum Gyn appt 6/16/25 for colposcopy    Follow-up  Return to clinic in 2 weeks for prenatal care      Rosette Hardin DO  [facility] Family Medicine Resident, [facility]

## 2025-01-24 ENCOUNTER — OFFICE VISIT (OUTPATIENT)
Dept: FAMILY MEDICINE | Facility: CLINIC | Age: 35
End: 2025-01-24
Payer: MEDICAID

## 2025-01-24 VITALS
OXYGEN SATURATION: 100 % | SYSTOLIC BLOOD PRESSURE: 110 MMHG | RESPIRATION RATE: 20 BRPM | WEIGHT: 168.38 LBS | TEMPERATURE: 98 F | BODY MASS INDEX: 29.84 KG/M2 | HEART RATE: 78 BPM | HEIGHT: 63 IN | DIASTOLIC BLOOD PRESSURE: 72 MMHG

## 2025-01-24 DIAGNOSIS — Z3A.30 30 WEEKS GESTATION OF PREGNANCY: Primary | ICD-10-CM

## 2025-01-24 LAB
BILIRUB SERPL-MCNC: NORMAL MG/DL
BLOOD URINE, POC: NORMAL
CLARITY, POC UA: CLEAR
COLOR, POC UA: YELLOW
GLUCOSE UR QL STRIP: NORMAL
KETONES UR QL STRIP: NORMAL
LEUKOCYTE ESTERASE URINE, POC: NORMAL
NITRITE, POC UA: NORMAL
PH, POC UA: 6.5
PROTEIN, POC: NORMAL
SPECIFIC GRAVITY, POC UA: 1.01
UROBILINOGEN, POC UA: 0.2

## 2025-01-24 PROCEDURE — 99214 OFFICE O/P EST MOD 30 MIN: CPT | Mod: PBBFAC

## 2025-01-24 PROCEDURE — 81002 URINALYSIS NONAUTO W/O SCOPE: CPT | Mod: PBBFAC

## 2025-01-24 RX ORDER — FERROUS SULFATE 325(65) MG
1 TABLET ORAL DAILY
COMMUNITY

## 2025-01-24 NOTE — PROGRESS NOTES
[facility] OB OFFICE VISIT NOTE  Tiff Carrero  34 y.o.  [id number]  2025      Chief Complaint: OB follow-up    : [id number] Bry Carrero is a 34 y.o.  female 30w5d by  first trimester ultrasound (JOSE RAMON Estimated Date of Delivery: 3/30/25) presenting to [facility] for routine prenatal visit.    Current Issues:   Pt states she is doing well at this time, she has no acute concerns today. Denied any vaginal bleeding, vaginal discharge or contraction like pain. Feels baby move often. Admits to taking her PO iron supplement; denied any symptoms of anemia.       OB History    Para Term  AB Living   7 4 4 0 2 4   SAB IAB Ectopic Multiple Live Births   2 0 0 0 4      # Outcome Date GA Lbr Myke/2nd Weight Sex Type Anes PTL Lv   7 Current            6 SAB 2024 6w0d    SAB None  FD   5 SAB 2023 8w0d    SAB None  FD   4 Term 14 40w0d  3.629 kg (8 lb) M Vag-Spont None N RAKESH   3 Term 12 40w0d  3.175 kg (7 lb) F Vag-Spont None N RAKESH   2 Term 10/07/09 40w0d  3.175 kg (7 lb) M Vag-Spont None N RAKESH   1 Term 05 40w0d  3.175 kg (7 lb) M Vag-Spont None N RAKESH       Gyn History:   LMP: Patient's last menstrual period was 2024 (exact date).  Age at menarche: 13 years  Menstrual hx: regular, 30 day cycles, 4-5 pads/day, 3-4 days per period  History of birth control: OCP (estrogen/progesterone), Depo-Provera and Nexplanon  History of STDs: None  Abnormal PAPs: ASCUS w/ pos HR-HPV (2024), NIL with neg HPV (2024)  History of prior : No    Past Medical History: Anemia  Surgical History: D&C post spontaneous miscarriage (2024)  Family History: Non-contributory   Social History: No smoking, No drinking, No recreational drug use    Medications:  See list below  Current Outpatient Medications   Medication Instructions    ferrous sulfate (FEOSOL) 325 mg (65 mg iron) Tab tablet 1 tablet, Oral, Daily    PNV,calcium 72-iron-folic acid  "(PRENATAL VITAMIN PLUS LOW IRON) 27 mg iron- 1 mg Tab 1 each, Oral, Daily        Review of Systems  As per HPI    Antepartum specific   - Fetal movements: Yes  - Vaginal bleeding: No  - Vaginal discharge: No  - Loss of fluid: No  - Contractions: No  - Headaches: No  - Vision changes: No  - Edema: No    Blood pressure 110/72, pulse 78, temperature 98.4 °F (36.9 °C), temperature source Oral, resp. rate 20, height 5' 3" (1.6 m), weight 76.4 kg (168 lb 6.4 oz), last menstrual period 06/14/2024, SpO2 100%.     Physical Exam   General: in no acute distress  RESP: clear to auscultation bilaterally, non labored  CV: regular rate and rhythm, no murmurs, no edema  ABD: gravid, nontender, BS+  FHTs: 150s bpm by Doppler  Fundal height: 30.5cm    Current Medications:   Current Outpatient Medications   Medication Sig Dispense Refill    ferrous sulfate (FEOSOL) 325 mg (65 mg iron) Tab tablet Take 1 tablet by mouth once daily.      PNV,calcium 72-iron-folic acid (PRENATAL VITAMIN PLUS LOW IRON) 27 mg iron- 1 mg Tab Take 1 tablet (1 each total) by mouth once daily. 30 tablet 11     No current facility-administered medications for this visit.       Labs:  Urine dipstick:   Lab Results   Component Value Date    COLORU Yellow 01/24/2025    SPECGRAV 1.010 01/24/2025    PHUR 6.5 01/24/2025    WBCUR neg 01/24/2025    NITRITE neg 01/24/2025    PROTEINPOC neg 01/24/2025    GLUCOSEUR neg 01/24/2025    KETONESU neg 01/24/2025    UROBILINOGEN 0.2 01/24/2025    BILIRUBINPOC neg 01/24/2025    RBCUR neg 01/24/2025       Initial OB Labs: (9/5/2024)  - Blood Type and Rh: O+  - Antibody Screen: Neg  - CBC H/H: 11.0/34.3  - HIV: NR  - Syphilis Ab: NR  - GC: Neg  - CT: Neg  - HBsAg: NR  - HCVAb: NR  - Rubella: Immune  - Varicella: Immune  - UA & Culture: NG  - Sickle Cell Screen: Neg  - PAP: NIL  - Influenza vaccine date: 10/2/2024  - BTL desired: No desire     15-20 Weeks Lab: (10/2/2024)  - Quad Screen: Normal risk    28 Week Lab 1/9/25  - 1H GTT: " 108  - Rhogam: N/A  - Date of Tdap: 1/9/25   - CBC H/H: 9.5/30.1  - Syphilis Ab: pending  - BTL consent: n/a    36 Week Lab  - CBC H/H:   - Syphilis Ab:   - GBS Culture:   - HIV:   - Cervical GC:   - Cervical CT:     Imaging:   Initial US 9/5/2024: Single viable intrauterine pregnancy with a crown-rump length indicative of a 10 week 4 day gestation. Small subchorionic bleed measuring 1.3 x 0.6 x 1.3 cm.     20 Week anatomy US: 12/10/24  A viable bar pregnancy is visualized in cephalic presentation. Estimated fetal weight is at the 70th percentile with an abdominal circumference at the 97th percentile   (growth is accelerated). No fetal abnormalities are noted and anatomic survey is complete. Amniotic fluid volume is mildly increased (borderline polyhydramnios). Placenta   is left, lateral, fundal.   Results briefly discussed with the patient. Please complete glucola.    We recommend evaluation in 8 weeks.  (Scheduled 2/2025)    Assessment/Plan:     30 weeks gestation of pregnancy  OB Protocol, PNVs   Urine dip reviewed as above  Mother plans to breast and/or bottled-feed: Both  Postpartum contraception discussion: nexplanon  FM Continuity patient after delivery: yes  Labor precautions discussed in depth; if vaginal bleeding or leaking fluid, belly cramping or pain, shortness of breath-chest pain, swelling of the face-hands, ankles and feet or legs. If don't feel the baby move in over an hour. Change in vision. Severe headache that are not resolved with medication  Will need iron panel at next visit to evaluate cause of anemia; continue with PO supplement    Hx of ASCUS, HR HPV+ (other)  Has postpartum Gyn appt 6/16/25 for colposcopy  Most recent (9/2024) PAP HR HPV negative    Follow-up  Return to clinic in 2 weeks for prenatal care          Joe Peres MD  [facility] Family Medicine [facility]

## 2025-01-25 NOTE — PROGRESS NOTES
I reviewed History, PE, A/P and chart was reviewed.  Services provided in the outpatient department of  a teaching facility, I was immediately available.  I agree with resident, care reasonable and necessary with any exceptions stated below.  Management discussed with resident at time of visit.    1/9 syphilis AB was (-) - update chart    PAP 6/2024  - ASCUS HPV (+)         9/2024   -  NIL, HPV (-)         FH 30, prev 28.5    Has 8 week  FU US 2/5/2025 -please discuss with OB staff if any abnl  Not followed in MFM clinic at this time           Gisell Jefferson MD  [employer] Family Medicine Residency - [place]  [facility]

## 2025-02-04 ENCOUNTER — PROCEDURE VISIT (OUTPATIENT)
Dept: MATERNAL FETAL MEDICINE | Facility: CLINIC | Age: 35
End: 2025-02-04
Payer: MEDICAID

## 2025-02-04 ENCOUNTER — OFFICE VISIT (OUTPATIENT)
Dept: MATERNAL FETAL MEDICINE | Facility: CLINIC | Age: 35
End: 2025-02-04
Payer: MEDICAID

## 2025-02-04 VITALS
DIASTOLIC BLOOD PRESSURE: 86 MMHG | WEIGHT: 168.63 LBS | HEIGHT: 63 IN | BODY MASS INDEX: 29.88 KG/M2 | SYSTOLIC BLOOD PRESSURE: 134 MMHG | HEART RATE: 71 BPM

## 2025-02-04 DIAGNOSIS — O40.9XX0 POLYHYDRAMNIOS AFFECTING PREGNANCY: Primary | ICD-10-CM

## 2025-02-04 DIAGNOSIS — Z36.89 ENCOUNTER FOR FETAL ANATOMIC SURVEY: ICD-10-CM

## 2025-02-04 PROCEDURE — 3075F SYST BP GE 130 - 139MM HG: CPT | Mod: CPTII,S$GLB,, | Performed by: OBSTETRICS & GYNECOLOGY

## 2025-02-04 PROCEDURE — 3079F DIAST BP 80-89 MM HG: CPT | Mod: CPTII,S$GLB,, | Performed by: OBSTETRICS & GYNECOLOGY

## 2025-02-04 PROCEDURE — 3008F BODY MASS INDEX DOCD: CPT | Mod: CPTII,S$GLB,, | Performed by: OBSTETRICS & GYNECOLOGY

## 2025-02-04 PROCEDURE — 1159F MED LIST DOCD IN RCRD: CPT | Mod: CPTII,S$GLB,, | Performed by: OBSTETRICS & GYNECOLOGY

## 2025-02-04 PROCEDURE — 76816 OB US FOLLOW-UP PER FETUS: CPT | Mod: S$GLB,,, | Performed by: OBSTETRICS & GYNECOLOGY

## 2025-02-04 PROCEDURE — 99203 OFFICE O/P NEW LOW 30 MIN: CPT | Mod: TH,S$GLB,, | Performed by: OBSTETRICS & GYNECOLOGY

## 2025-02-04 PROCEDURE — 76819 FETAL BIOPHYS PROFIL W/O NST: CPT | Mod: S$GLB,,, | Performed by: OBSTETRICS & GYNECOLOGY

## 2025-02-04 NOTE — PROGRESS NOTES
MATERNAL-FETAL MEDICINE   CONSULT NOTE    Provider requesting consultation: [facility]    SUBJECTIVE:     Ms. Tiff Carrero is a 34 y.o.  female with IUP at 32w2d who is seen in consultation by MFM for evaluation and management of:  Problem   Polyhydramnios Affecting Pregnancy        She was seen for consultation due to ultrasound findings. She is feeling well and has no current complaints. No LOF, VB, ctx. +FM.     Medication List with Changes/Refills   Current Medications    PNV,CALCIUM 72-IRON-FOLIC ACID (PRENATAL VITAMIN PLUS LOW IRON) 27 MG IRON- 1 MG TAB    Take 1 tablet (1 each total) by mouth once daily.   Discontinued Medications    FERROUS SULFATE (FEOSOL) 325 MG (65 MG IRON) TAB TABLET    Take 1 tablet by mouth once daily.       Review of patient's allergies indicates:  No Known Allergies    PMH:  Past Medical History:   Diagnosis Date    Abnormal Pap smear of cervix     Anemia        PObHx:  OB History    Para Term  AB Living   7 4 4 0 2 4   SAB IAB Ectopic Multiple Live Births   2 0 0 0 4      # Outcome Date GA Lbr Myke/2nd Weight Sex Type Anes PTL Lv   7 Current            6 SAB 2024 6w0d    SAB None  FD   5 SAB 2023 8w0d    SAB None  FD   4 Term 14 40w0d  3.629 kg (8 lb) M Vag-Spont None N RAKESH   3 Term 12 40w0d  3.175 kg (7 lb) F Vag-Spont None N RAKESH   2 Term 10/07/09 40w0d  3.175 kg (7 lb) M Vag-Spont None N RAKESH   1 Term 05 40w0d  3.175 kg (7 lb) M Vag-Spont None N RAKESH       PSH:History reviewed. No pertinent surgical history.    Family history:family history includes No Known Problems in her brother, father, mother, and sister.    Social history: reports that she has never smoked. She has never been exposed to tobacco smoke. She has never used smokeless tobacco. She reports that she does not drink alcohol and does not use drugs.    Genetic history:  The patient denies any inherited genetic diseases or birth defects in herself or her partner's personal  "history or family.    Objective:   /86 (BP Location: Right arm, Patient Position: Sitting)   Pulse 71   Ht 5' 3" (1.6 m)   Wt 76.5 kg (168 lb 10.4 oz)   LMP 2024 (Exact Date)   BMI 29.88 kg/m²       Ultrasound performed. See viewpoint for full ultrasound report.  A viable bar pregnancy is visualized in cephalic presentation.  Estimated fetal weight is at the 39th percentile with an abdominal circumference at the 83rd percentile.    No fetal abnormalities are noted and previous anatomic survey is complete. Mild polyhydramnios is noted, measuring 24cm. Placenta is left, lateral. Biophysical profile is 8.       ASSESSMENT/PLAN:     34 y.o.  female with IUP at 32w2d     Polyhydramnios affecting pregnancy  Polyhydramnios is defined as an MVP >= 8cm or SHILOH >= 24cm and complicates about 1-2% of bar pregnancies.    It can be subclassified as:  Classification SHILOH (cm) MVP (cm)   Mild 24.0-29.9 8-11   Moderate 30-34.9 12-15   Severe >= 35.0 >= 16   MVP should be used to diagnose polyhydramnios in the setting of multiple gestations. In bar pregnancy, SHILOH is used to establish the diagnosis.    The two major causes for polyhydramnios are diabetes and fetal malformations, but for most cases of mild polyhydramnios, 60-70% is of unknown cause (idiopathic). Polyhydramnios due to an identified cause is associated with an increased risk of  delivery, small-for-gestational age infant, macrosomia, and  mortality.  Polyhydramnios, from any cause, has also been associated with increased risk of non-vertex presentation, dysfunctional labor, increased  and operative vaginal delivery rates and postpartum hemorrhage.  mortality varies depending on associated anomaly. Risk of major anomaly detected after birth, without prenatal detection, is 1% with mild, 2% with moderate and 11% with severe polyhydramnios.    Workup should include a targeted fetal anatomical " ultrasound looking for associated anomalies. Additionally, a glucose screening test (repeat if it was performed >1 month prior) should be performed. If hydrops or other sonographic signs are present, a workup for fetal anemia (antibody screen) and congenital infections (TORCH titers) is warranted. Amniocentesis (with microarray) should be offered to all patients and should be strongly considered if there is severe polyhydramnios, fetal anomaly on ultrasound, growth restriction, or if detected before 24 weeks. Progression of polyhydramnios is more suggestive of an underlying structural or genetic etiology. Severe polyhydramnios carries a high likelihood of undiagnosed fetal anomalies, and we recommend delivery at a tertiary center.  Indomethacin is NOT recommended for the purpose of treating polyhydramnios.    testing is not recommended in the setting of mild idiopathic polyhydramnios.  testing should be performed for moderate and severe polyhydramnios.    2/4- Mild polyhydramnios noted. Glucola was normal.    Recommendations:  Mild polyhydramnios:  testing not indicated; repeat ultrasound once for growth/fluid in 4 weeks; delivery per usual obstetric indications  Amnioreduction should be reserved for maternal symptomatology (dyspnea, severe discomfort or both) in the setting of severe polyhydramnios with assistance of MFM.  Mode of delivery should be based on usual obstetric indications  ECV may be performed for malpresentation, if no other contraindications present  If amniotomy is indicated, consider using a controlled, slow rupture for moderate to severe polyhydramnios.  Consider having uterotonics readily available at the time of delivery.  Pediatrics should be considered at the time of delivery in all cases of severe polyhydramnios        FOLLOW UP:   F/u in 4 weeks for US/MFM visit        Venita Marie  Maternal-Fetal Medicine    Electronically Signed by Venita Marie  February 4, 2025

## 2025-02-05 DIAGNOSIS — O40.9XX0 POLYHYDRAMNIOS AFFECTING PREGNANCY: Primary | ICD-10-CM

## 2025-02-05 NOTE — ASSESSMENT & PLAN NOTE
Polyhydramnios is defined as an MVP >= 8cm or SHILOH >= 24cm and complicates about 1-2% of bar pregnancies.    It can be subclassified as:  Classification SHILOH (cm) MVP (cm)   Mild 24.0-29.9 8-11   Moderate 30-34.9 12-15   Severe >= 35.0 >= 16   MVP should be used to diagnose polyhydramnios in the setting of multiple gestations. In bar pregnancy, SHILOH is used to establish the diagnosis.    The two major causes for polyhydramnios are diabetes and fetal malformations, but for most cases of mild polyhydramnios, 60-70% is of unknown cause (idiopathic). Polyhydramnios due to an identified cause is associated with an increased risk of  delivery, small-for-gestational age infant, macrosomia, and  mortality.  Polyhydramnios, from any cause, has also been associated with increased risk of non-vertex presentation, dysfunctional labor, increased  and operative vaginal delivery rates and postpartum hemorrhage.  mortality varies depending on associated anomaly. Risk of major anomaly detected after birth, without prenatal detection, is 1% with mild, 2% with moderate and 11% with severe polyhydramnios.    Workup should include a targeted fetal anatomical ultrasound looking for associated anomalies. Additionally, a glucose screening test (repeat if it was performed >1 month prior) should be performed. If hydrops or other sonographic signs are present, a workup for fetal anemia (antibody screen) and congenital infections (TORCH titers) is warranted. Amniocentesis (with microarray) should be offered to all patients and should be strongly considered if there is severe polyhydramnios, fetal anomaly on ultrasound, growth restriction, or if detected before 24 weeks. Progression of polyhydramnios is more suggestive of an underlying structural or genetic etiology. Severe polyhydramnios carries a high likelihood of undiagnosed fetal anomalies, and we recommend delivery at a tertiary  [place].  Indomethacin is NOT recommended for the purpose of treating polyhydramnios.    testing is not recommended in the setting of mild idiopathic polyhydramnios.  testing should be performed for moderate and severe polyhydramnios.    2/4- Mild polyhydramnios noted. Glucola was normal.    Recommendations:  Mild polyhydramnios:  testing not indicated; repeat ultrasound once for growth/fluid in 4 weeks; delivery per usual obstetric indications  Amnioreduction should be reserved for maternal symptomatology (dyspnea, severe discomfort or both) in the setting of severe polyhydramnios with assistance of MFM.  Mode of delivery should be based on usual obstetric indications  ECV may be performed for malpresentation, if no other contraindications present  If amniotomy is indicated, consider using a controlled, slow rupture for moderate to severe polyhydramnios.  Consider having uterotonics readily available at the time of delivery.  Pediatrics should be considered at the time of delivery in all cases of severe polyhydramnios

## 2025-02-07 ENCOUNTER — OFFICE VISIT (OUTPATIENT)
Dept: FAMILY MEDICINE | Facility: CLINIC | Age: 35
End: 2025-02-07
Payer: MEDICAID

## 2025-02-07 VITALS
WEIGHT: 171.38 LBS | HEIGHT: 63 IN | RESPIRATION RATE: 20 BRPM | BODY MASS INDEX: 30.37 KG/M2 | TEMPERATURE: 98 F | DIASTOLIC BLOOD PRESSURE: 74 MMHG | SYSTOLIC BLOOD PRESSURE: 109 MMHG | OXYGEN SATURATION: 98 % | HEART RATE: 76 BPM

## 2025-02-07 DIAGNOSIS — D50.9 MICROCYTIC ANEMIA: ICD-10-CM

## 2025-02-07 DIAGNOSIS — R87.810 ASCUS WITH POSITIVE HIGH RISK HPV CERVICAL: ICD-10-CM

## 2025-02-07 DIAGNOSIS — Z3A.32 32 WEEKS GESTATION OF PREGNANCY: Primary | ICD-10-CM

## 2025-02-07 DIAGNOSIS — R87.610 ASCUS WITH POSITIVE HIGH RISK HPV CERVICAL: ICD-10-CM

## 2025-02-07 DIAGNOSIS — O40.9XX0 POLYHYDRAMNIOS AFFECTING PREGNANCY: ICD-10-CM

## 2025-02-07 LAB
BILIRUB SERPL-MCNC: NEGATIVE MG/DL
BLOOD URINE, POC: NEGATIVE
CLARITY, POC UA: CLEAR
COLOR, POC UA: NORMAL
FERRITIN SERPL-MCNC: 3.46 NG/ML (ref 4.63–204)
GLUCOSE UR QL STRIP: NEGATIVE
IRON SATN MFR SERPL: 5 % (ref 20–50)
IRON SERPL-MCNC: 29 UG/DL (ref 50–170)
KETONES UR QL STRIP: NEGATIVE
LEUKOCYTE ESTERASE URINE, POC: NEGATIVE
NITRITE, POC UA: NEGATIVE
PH, POC UA: 6.5
PROTEIN, POC: NEGATIVE
SPECIFIC GRAVITY, POC UA: 1.01
TIBC SERPL-MCNC: 595 UG/DL (ref 70–310)
TIBC SERPL-MCNC: 624 UG/DL (ref 250–450)
TRANSFERRIN SERPL-MCNC: 576 MG/DL (ref 180–382)
UROBILINOGEN, POC UA: NORMAL

## 2025-02-07 PROCEDURE — 81002 URINALYSIS NONAUTO W/O SCOPE: CPT | Mod: PBBFAC

## 2025-02-07 PROCEDURE — 99214 OFFICE O/P EST MOD 30 MIN: CPT | Mod: PBBFAC

## 2025-02-07 PROCEDURE — 83540 ASSAY OF IRON: CPT

## 2025-02-07 PROCEDURE — 36415 COLL VENOUS BLD VENIPUNCTURE: CPT

## 2025-02-07 PROCEDURE — 82728 ASSAY OF FERRITIN: CPT

## 2025-02-07 RX ORDER — FERROUS SULFATE 325(65) MG
325 TABLET ORAL
Refills: 0 | Status: CANCELLED | OUTPATIENT
Start: 2025-02-07

## 2025-02-07 NOTE — PROGRESS NOTES
"[facility] OB OFFICE VISIT NOTE  Tiff Carrero  34 y.o.  [id number]  2025      Chief Complaint: OB follow-up    : [id number] Alycia Carrero is a 34 y.o.  female 32w5d by  first trimester ultrasound (JOSE RAMON Estimated Date of Delivery: 3/30/25) presenting to [facility] for routine prenatal visit.    Current Issues:   Patient denies any acute complaints or concerns today.  Says she is having lower pelvic "pulling." States that the pulling is worse when she tries to lay down to go to sleep where she is on her feet for a long period of time.  Denies any nausea or vomiting.  Patient has been taking her prenatal vitamin daily but has not been on oral iron since the start of the pregnancy.    Antepartum specific ROS:  - Fetal movements: Yes  - Vaginal bleeding: No  - Vaginal discharge: No  - Loss of fluid: No  - Contractions: No  - Headaches: No  - Vision changes: No  - Edema: No    Review of Systems  As per HPI above.        OB History    Para Term  AB Living   7 4 4 0 2 4   SAB IAB Ectopic Multiple Live Births   2 0 0 0 4      # Outcome Date GA Lbr Myke/2nd Weight Sex Type Anes PTL Lv   7 Current            6 SAB 2024 6w0d    SAB None  FD   5 SAB 2023 8w0d    SAB None  FD   4 Term 14 40w0d  3.629 kg (8 lb) M Vag-Spont None N RAKESH   3 Term 12 40w0d  3.175 kg (7 lb) F Vag-Spont None N RAKESH   2 Term 10/07/09 40w0d  3.175 kg (7 lb) M Vag-Spont None N RAKESH   1 Term 05 40w0d  3.175 kg (7 lb) M Vag-Spont None N RAKESH       Gyn History:   LMP: Patient's last menstrual period was 2024 (exact date).  Age at menarche: 13 years  Menstrual hx: regular, 30 day cycles, 4-5 pads/day, 3-4 days per period  History of birth control: OCP (estrogen/progesterone), Depo-Provera and Nexplanon  History of STDs: None  Abnormal PAPs: ASCUS w/ pos HR-HPV (2024), NIL with neg HPV (2024)  History of prior : No    Past Medical History: Anemia  Surgical " "History: D&C post spontaneous miscarriage (5/21/2024)  Family History: Non-contributory   Social History: Denies smoking, drinking, and recreational drug use.    Outpatient Medications as of 2/7/2025   Medication Sig Dispense Refill    PNV,calcium 72-iron-folic acid (PRENATAL VITAMIN PLUS LOW IRON) 27 mg iron- 1 mg Tab Take 1 tablet (1 each total) by mouth once daily. 30 tablet 11     No current facility-administered medications on file as of 2/7/2025.       Vitals:    02/07/25 1309   BP: 109/74   BP Location: Left arm   Patient Position: Sitting   Pulse: 76   Resp: 20   Temp: 98.1 °F (36.7 °C)   TempSrc: Oral   SpO2: 98%   Weight: 77.7 kg (171 lb 6.4 oz)   Height: 5' 3" (1.6 m)        Physical Exam   General: in no acute distress; gravid female  RESP: clear to auscultation bilaterally, non labored  CV: regular rate and rhythm, no murmurs, no edema  ABD: gravid, nontender, BS+  FHTs: 135s bpm by Doppler  Fundal height: 32 cm    Current Medications:   Outpatient Medications as of 2/7/2025   Medication Sig Dispense Refill    PNV,calcium 72-iron-folic acid (PRENATAL VITAMIN PLUS LOW IRON) 27 mg iron- 1 mg Tab Take 1 tablet (1 each total) by mouth once daily. 30 tablet 11     No current facility-administered medications on file as of 2/7/2025.          Labs:  Urine dipstick:       Component 13:26   Glucose, UA negative   Bilirubin, POC negative   Ketones, UA negative   Spec Grav UA 1.010   Blood, UA negative   pH, UA 6.5   Protein, POC negative   Urobilinogen, UA 0.2 E.U./dL   Nitrite, UA negative   WBC, UA negative   Color, UA Light Yellow   Clarity, UA Clear               Initial OB Labs: (9/5/2024)  - Blood Type and Rh: O+  - Antibody Screen: Neg  - CBC H/H: 11.0/34.3  - HIV: NR  - Syphilis Ab: NR  - GC: Neg  - CT: Neg  - HBsAg: NR  - HCVAb: NR  - Rubella: Immune  - Varicella: Immune  - UA & Culture: NG  - Sickle Cell Screen: Neg  - PAP: NIL  - Influenza vaccine date: 10/2/2024  - BTL desired: No desire     15-20 " Weeks Lab: (10/2/2024)  - Quad Screen: Normal risk    28 Week Lab 1/9/25  - 1H GTT: 108  - Rhogam: N/A  - Date of Tdap: 1/9/25   - CBC H/H: 9.5/30.1  - Syphilis Ab:NR  - BTL consent: n/a    36 Week Lab  - CBC H/H:   - Syphilis Ab:   - GBS Culture:   - HIV:   - Cervical GC:   - Cervical CT:     Imaging:     Initial US: 9/5/2024  Single viable intrauterine pregnancy with a crown-rump length indicative of a 10 week 4 day gestation. Small subchorionic bleed measuring 1.3 x 0.6 x 1.3 cm.     20 Week anatomy US: 12/10/24  A viable bar pregnancy is visualized in cephalic presentation. Estimated fetal weight is at the 70th percentile with an abdominal circumference at the 97th percentile   (growth is accelerated). No fetal abnormalities are noted and anatomic survey is complete. Amniotic fluid volume is mildly increased (borderline polyhydramnios). Placenta   is left, lateral, fundal.   Results briefly discussed with the patient. Please complete glucola.    We recommend evaluation in 8 weeks.     Repeat anatomy scan: (2/4/25)  A viable bar pregnancy is visualized in cephalic presentation. Estimated fetal weight is at the 39th percentile with an abdominal circumference at the 83rd percentile.   No fetal abnormalities are noted and previous anatomic survey is complete. Mild polyhydramnios is noted, measuring 24cm. Placenta is left, lateral. Biophysical profile is   8/8.    We recommend evaluation in 4 weeks. (scheduled for 03/05/2025)      Assessment/Plan:     32 weeks gestation of pregnancy  - OB Protocol; continue to take PNVs daily  - Urine dip reviewed as above  - Mother plans to breast and/or bottled-feed: Both  - Postpartum contraception discussion: nexplanon  - FM Continuity patient after delivery: yes  - Labor precautions discussed in depth; if vaginal bleeding or leaking fluid, belly cramping or pain, shortness of breath-chest pain, swelling of the face-hands, ankles and feet or legs. If don't feel the  "baby move in over an hour. Change in vision. Severe headache that are not resolved with medication    Polyhydramnios affecting pregnancy  - noted to be "mild" and patient continues to follow with M for this issue  - repeat anatomy scan scheduled for 03/05/2025    Microcytic anemia  - H/H noted to be 9.5/30.1 on last CBC from 01/09/2025  - Iron panel and ferritin ordered to evaluate anemia further  - consider adding oral iron if indicated based on lab results.    Hx of ASCUS, HR HPV+ (other)  - Has postpartum Gyn appt 6/16/25 for colposcopy  - Most recent (9/2024) PAP HR HPV negative        Return to clinic in 2 weeks for prenatal care in high-risk OB Clinic.        Lisa Hale DO  [facility]    "

## 2025-02-19 NOTE — PROGRESS NOTES
[facility] OB OFFICE VISIT NOTE  Tiff Carrero  34 y.o.  [id number]  2025     #[id number] ([place]) used for duration of visit     Chief Complaint: Routine Prenatal Visit (HROB 34w4d, based on previous US. Questions about previous blood work for anemia.)      Tiff Carrero is a 34 y.o.  female at 34w3d by first trimester ultrasound (JOSE RAMON Estimated Date of Delivery: 3/30/25) presenting to [facility] for HROB 2 week f/u.    Current Issues: No concern and doing well. Denies fever/chills, HA blurred vision, CP, SOB, dysuria, constipation/diarrhea, N/V, depression/anxiety    Relevant PMH:   Anemia    Gestational History:    OB History    Para Term  AB Living   7 4 4 0 2 4   SAB IAB Ectopic Multiple Live Births   2 0 0 0 4      # Outcome Date GA Lbr Myke/2nd Weight Sex Type Anes PTL Lv   7 Current            6 SAB 2024 6w0d    SAB None  FD   5 SAB 2023 8w0d    SAB None  FD   4 Term 14 40w0d  3.629 kg (8 lb) M Vag-Spont None N RAKESH   3 Term 12 40w0d  3.175 kg (7 lb) F Vag-Spont None N RAKESH   2 Term 10/07/09 40w0d  3.175 kg (7 lb) M Vag-Spont None N RAKESH   1 Term 05 40w0d  3.175 kg (7 lb) M Vag-Spont None N RAKESH       Gyn History:   LMP: Patient's last menstrual period was 2024 (exact date).  Age at menarche: 13 years  Menstrual hx: regular, 30 day cycles, 4-5 pads/day, 3-4 days per period  History of birth control: OCP (estrogen/progesterone), Depo-Provera and Nexplanon   History of STDs: None  Abnormal PAPs: ASCUS w/ pos HR-HPV (2024), NIL with neg HPV (2024)   History of prior : No    Surgical History:  D&C post spontaneous miscarriage (2024)   Family History:  Noncontributory   Social History:  Denies smoking, drinking, and recreational drug use     Antepartum specific:   Fetal movements: Yes  Vaginal bleeding: No  Vaginal discharge: No  Loss of fluid: No  Contractions: No  Headaches: No  Vision changes: No  Edema:  "No    Vitals:    02/20/25 1416   BP: 126/78   BP Location: Left arm   Patient Position: Sitting   Pulse: 63   Resp: 16   Temp: 97.9 °F (36.6 °C)   TempSrc: Oral   SpO2: 98%   Weight: 78.4 kg (172 lb 13.5 oz)   Height: 5' 3" (1.6 m)     Physical Exam:   General: in no acute distress  RESP: clear to auscultation bilaterally, non labored  CV: regular rate and rhythm, no murmurs, no edema  ABD: gravid, nontender, BS+  FHTs: 140 bpm by Doppler  Fundal height: 34 cm    Current Medications:   Current Outpatient Medications   Medication Instructions    PNV,calcium 72-iron-folic acid (PRENATAL VITAMIN PLUS LOW IRON) 27 mg iron- 1 mg Tab 1 each, Oral, Daily       Labs:  Urine dipstick:   Lab Results   Component Value Date    COLORU Light Yellow 02/20/2025    SPECGRAV 1.010 02/20/2025    PHUR 6.5 02/20/2025    WBCUR neg 02/20/2025    NITRITE neg 02/20/2025    PROTEINPOC neg 02/20/2025    GLUCOSEUR neg 02/20/2025    KETONESU neg 02/20/2025    UROBILINOGEN 0.2 02/20/2025    BILIRUBINPOC neg 02/20/2025    RBCUR neg 02/20/2025       Initial OB Labs: (9/5/2024)  Blood Type and Rh: O Pos  Antibody Screen: Neg  CBC H/H: 11/34.3  HIV: NR  Syphilis Ab (RPR): NR  GC: Neg  CT: Neg  HBsAg: NR  HCVAb: NR  Rubella: Immune  Varicella: Immune  UA & Culture: NG  Sickle Cell Screen: Neg  PAP: NIL, HRHPV neg   Influenza vaccine date: 10/2/2024  BTL desired: No    15-20 Weeks Lab: (10/2/2024)   Quad Screen: Normal risk    28 Week Lab: (1/9/2025)  1H GTT: 108  Rhogam (N/A if not applicable): N/A  Date of Tdap: 1/9/2025  CBC H/H: 9.5/30.1  Syphilis Ab: NR  BTL consent: N/A    Imaging:   Initial US:  9/5/2024  Single viable intrauterine pregnancy with a crown-rump length indicative of a 10 week 4 day gestation. Small subchorionic bleed measuring 1.3 x 0.6 x 1.3 cm     Anatomy Scan:  12/10/2024  A viable bar pregnancy is visualized in cephalic presentation. Estimated fetal weight is at the 70th percentile with an abdominal circumference at the " 97th percentile (growth is accelerated). No fetal abnormalities are noted and anatomic survey is complete. Amniotic fluid volume is mildly increased (borderline polyhydramnios). Placenta is left, lateral, fundal.     2/4/2025  A viable bar pregnancy is visualized in cephalic presentation. Estimated fetal weight is at the 39th percentile with an abdominal circumference at the 83rd percentile. No fetal abnormalities are noted and previous anatomic survey is complete. Mild polyhydramnios is noted, measuring 24cm. Placenta is left, lateral. Biophysical profile is 8/8.     Assessment and Plan:    Polyhydramnios affecting pregnancy  Continues to follow with MFM   Anatomy scan(2/4/2025) showed mild polyhydramnios  Anatomy scan scheduled for 03/05/2025    Microcytic anemia  H/H noted to be 9.5/30.1 on last CBC from 01/09/2025  Iron panel (2/7/2025): Iron 29, TIBC 624, Transferrin 576, and Ferritin 3.46  F/u with MFM     Hx of ASCUS, HR HPV+ (other)  PAP (9/2024): NIL and  HR HPV negative  Has postpartum Gyn appt 6/16/25 for colposcopy    34 weeks gestation of pregnancy   OB Protocol  PNVs  Urine dip reviewed as above  Routine labs: reviewed as above  Mother plans to breast and bottle-feed  Postpartum contraception discussion: Nexplanon  FM Continuity patient after delivery: Yes  ED and labor precautions discussed in depth; if vaginal bleeding or leaking fluid, belly cramping or pain, shortness of breath-chest pain, swelling of the face-hands, ankles and feet or legs. If don't feel the baby move in over an hour. Change in vision. Severe headache that are not resolved with medication  Return to clinic in 2 weeks for JONYOB    Yonatan Sanchez MD  [facility] Family [facility] Resident HO-1  02/20/2025

## 2025-02-20 ENCOUNTER — OFFICE VISIT (OUTPATIENT)
Dept: FAMILY MEDICINE | Facility: CLINIC | Age: 35
End: 2025-02-20
Payer: MEDICAID

## 2025-02-20 VITALS
BODY MASS INDEX: 30.62 KG/M2 | SYSTOLIC BLOOD PRESSURE: 126 MMHG | OXYGEN SATURATION: 98 % | HEIGHT: 63 IN | DIASTOLIC BLOOD PRESSURE: 78 MMHG | HEART RATE: 63 BPM | RESPIRATION RATE: 16 BRPM | WEIGHT: 172.81 LBS | TEMPERATURE: 98 F

## 2025-02-20 DIAGNOSIS — Z3A.34 34 WEEKS GESTATION OF PREGNANCY: Primary | ICD-10-CM

## 2025-02-20 LAB
BILIRUB SERPL-MCNC: NORMAL MG/DL
BLOOD URINE, POC: NORMAL
CLARITY, POC UA: CLEAR
COLOR, POC UA: NORMAL
GLUCOSE UR QL STRIP: NORMAL
KETONES UR QL STRIP: NORMAL
LEUKOCYTE ESTERASE URINE, POC: NORMAL
NITRITE, POC UA: NORMAL
PH, POC UA: 6.5
PROTEIN, POC: NORMAL
SPECIFIC GRAVITY, POC UA: 1.01
UROBILINOGEN, POC UA: 0.2

## 2025-02-20 PROCEDURE — 99213 OFFICE O/P EST LOW 20 MIN: CPT | Mod: PBBFAC

## 2025-03-05 ENCOUNTER — OFFICE VISIT (OUTPATIENT)
Dept: MATERNAL FETAL MEDICINE | Facility: CLINIC | Age: 35
End: 2025-03-05
Payer: MEDICAID

## 2025-03-05 ENCOUNTER — RESULTS FOLLOW-UP (OUTPATIENT)
Dept: MATERNAL FETAL MEDICINE | Facility: HOSPITAL | Age: 35
End: 2025-03-05

## 2025-03-05 ENCOUNTER — LAB VISIT (OUTPATIENT)
Dept: LAB | Facility: HOSPITAL | Age: 35
End: 2025-03-05
Attending: OBSTETRICS & GYNECOLOGY
Payer: MEDICAID

## 2025-03-05 ENCOUNTER — PROCEDURE VISIT (OUTPATIENT)
Dept: MATERNAL FETAL MEDICINE | Facility: CLINIC | Age: 35
End: 2025-03-05
Payer: MEDICAID

## 2025-03-05 VITALS
HEART RATE: 65 BPM | HEIGHT: 63 IN | BODY MASS INDEX: 30.84 KG/M2 | SYSTOLIC BLOOD PRESSURE: 144 MMHG | WEIGHT: 174.06 LBS | DIASTOLIC BLOOD PRESSURE: 89 MMHG

## 2025-03-05 DIAGNOSIS — O40.9XX0 POLYHYDRAMNIOS AFFECTING PREGNANCY: ICD-10-CM

## 2025-03-05 DIAGNOSIS — O16.3 ELEVATED BLOOD PRESSURE AFFECTING PREGNANCY IN THIRD TRIMESTER, ANTEPARTUM: Primary | ICD-10-CM

## 2025-03-05 DIAGNOSIS — O09.523 AMA (ADVANCED MATERNAL AGE) MULTIGRAVIDA 35+, THIRD TRIMESTER: ICD-10-CM

## 2025-03-05 DIAGNOSIS — O16.3 ELEVATED BLOOD PRESSURE AFFECTING PREGNANCY IN THIRD TRIMESTER, ANTEPARTUM: ICD-10-CM

## 2025-03-05 LAB
ALBUMIN SERPL-MCNC: 3 G/DL (ref 3.5–5)
ALBUMIN/GLOB SERPL: 0.8 RATIO (ref 1.1–2)
ALP SERPL-CCNC: 122 UNIT/L (ref 40–150)
ALT SERPL-CCNC: 19 UNIT/L (ref 0–55)
ANION GAP SERPL CALC-SCNC: 10 MEQ/L
AST SERPL-CCNC: 17 UNIT/L (ref 5–34)
BASOPHILS # BLD AUTO: 0.04 X10(3)/MCL
BASOPHILS NFR BLD AUTO: 0.4 %
BILIRUB SERPL-MCNC: 0.2 MG/DL
BUN SERPL-MCNC: 6.4 MG/DL (ref 7–18.7)
CALCIUM SERPL-MCNC: 9.2 MG/DL (ref 8.4–10.2)
CHLORIDE SERPL-SCNC: 108 MMOL/L (ref 98–107)
CO2 SERPL-SCNC: 19 MMOL/L (ref 22–29)
CREAT SERPL-MCNC: 0.65 MG/DL (ref 0.55–1.02)
CREAT UR-MCNC: 18.4 MG/DL (ref 45–106)
CREAT/UREA NIT SERPL: 10
EOSINOPHIL # BLD AUTO: 0.08 X10(3)/MCL (ref 0–0.9)
EOSINOPHIL NFR BLD AUTO: 0.8 %
ERYTHROCYTE [DISTWIDTH] IN BLOOD BY AUTOMATED COUNT: 19.3 % (ref 11.5–17)
GFR SERPLBLD CREATININE-BSD FMLA CKD-EPI: >60 ML/MIN/1.73/M2
GLOBULIN SER-MCNC: 3.8 GM/DL (ref 2.4–3.5)
GLUCOSE SERPL-MCNC: 100 MG/DL (ref 74–100)
HCT VFR BLD AUTO: 33.5 % (ref 37–47)
HGB BLD-MCNC: 10.2 G/DL (ref 12–16)
IMM GRANULOCYTES # BLD AUTO: 0.08 X10(3)/MCL (ref 0–0.04)
IMM GRANULOCYTES NFR BLD AUTO: 0.8 %
LDH SERPL-CCNC: 164 U/L (ref 125–220)
LYMPHOCYTES # BLD AUTO: 2.11 X10(3)/MCL (ref 0.6–4.6)
LYMPHOCYTES NFR BLD AUTO: 21.4 %
MCH RBC QN AUTO: 23.6 PG (ref 27–31)
MCHC RBC AUTO-ENTMCNC: 30.4 G/DL (ref 33–36)
MCV RBC AUTO: 77.4 FL (ref 80–94)
MONOCYTES # BLD AUTO: 0.45 X10(3)/MCL (ref 0.1–1.3)
MONOCYTES NFR BLD AUTO: 4.6 %
NEUTROPHILS # BLD AUTO: 7.09 X10(3)/MCL (ref 2.1–9.2)
NEUTROPHILS NFR BLD AUTO: 72 %
NRBC BLD AUTO-RTO: 0 %
PLATELET # BLD AUTO: 357 X10(3)/MCL (ref 130–400)
PMV BLD AUTO: 10.6 FL (ref 7.4–10.4)
POTASSIUM SERPL-SCNC: 4 MMOL/L (ref 3.5–5.1)
PROT SERPL-MCNC: 6.8 GM/DL (ref 6.4–8.3)
PROT UR STRIP-MCNC: <6.8 MG/DL
RBC # BLD AUTO: 4.33 X10(6)/MCL (ref 4.2–5.4)
SODIUM SERPL-SCNC: 137 MMOL/L (ref 136–145)
URATE SERPL-MCNC: 4.5 MG/DL (ref 2.6–6)
WBC # BLD AUTO: 9.85 X10(3)/MCL (ref 4.5–11.5)

## 2025-03-05 PROCEDURE — 36415 COLL VENOUS BLD VENIPUNCTURE: CPT

## 2025-03-05 PROCEDURE — 80053 COMPREHEN METABOLIC PANEL: CPT

## 2025-03-05 PROCEDURE — 76819 FETAL BIOPHYS PROFIL W/O NST: CPT | Mod: S$GLB,,, | Performed by: OBSTETRICS & GYNECOLOGY

## 2025-03-05 PROCEDURE — 83615 LACTATE (LD) (LDH) ENZYME: CPT

## 2025-03-05 PROCEDURE — 84156 ASSAY OF PROTEIN URINE: CPT

## 2025-03-05 PROCEDURE — 99214 OFFICE O/P EST MOD 30 MIN: CPT | Mod: TH,S$GLB,, | Performed by: OBSTETRICS & GYNECOLOGY

## 2025-03-05 PROCEDURE — 3008F BODY MASS INDEX DOCD: CPT | Mod: CPTII,S$GLB,, | Performed by: OBSTETRICS & GYNECOLOGY

## 2025-03-05 PROCEDURE — 3079F DIAST BP 80-89 MM HG: CPT | Mod: CPTII,S$GLB,, | Performed by: OBSTETRICS & GYNECOLOGY

## 2025-03-05 PROCEDURE — 3077F SYST BP >= 140 MM HG: CPT | Mod: CPTII,S$GLB,, | Performed by: OBSTETRICS & GYNECOLOGY

## 2025-03-05 PROCEDURE — 1160F RVW MEDS BY RX/DR IN RCRD: CPT | Mod: CPTII,S$GLB,, | Performed by: OBSTETRICS & GYNECOLOGY

## 2025-03-05 PROCEDURE — 1159F MED LIST DOCD IN RCRD: CPT | Mod: CPTII,S$GLB,, | Performed by: OBSTETRICS & GYNECOLOGY

## 2025-03-05 PROCEDURE — 85025 COMPLETE CBC W/AUTO DIFF WBC: CPT

## 2025-03-05 PROCEDURE — 84550 ASSAY OF BLOOD/URIC ACID: CPT

## 2025-03-05 PROCEDURE — 76816 OB US FOLLOW-UP PER FETUS: CPT | Mod: S$GLB,,, | Performed by: OBSTETRICS & GYNECOLOGY

## 2025-03-05 NOTE — PROGRESS NOTES
"  MATERNAL-FETAL MEDICINE PROGRESS NOTE      SUBJECTIVE:     Ms. Tiff Carrero is a 34 y.o.  female with IUP at 36w3d who is seen in consultation by MFM for evaluation and management of:  Problem   Elevated Blood Pressure Affecting Pregnancy in Third Trimester, Antepartum   Polyhydramnios Affecting Pregnancy       She is feeling well and has no current complaints. No LOF, VB, ctx. +FM. BP elevated x 2. She is asymptomatic and has no HA, vison changes, edema, GERD.     Medication List with Changes/Refills   Current Medications    PNV,CALCIUM 72-IRON-FOLIC ACID (PRENATAL VITAMIN PLUS LOW IRON) 27 MG IRON- 1 MG TAB    Take 1 tablet (1 each total) by mouth once daily.     Objective:   BP (!) 144/89 (BP Location: Right arm, Patient Position: Sitting)   Pulse 65   Ht 5' 3" (1.6 m)   Wt 78.9 kg (174 lb 0.9 oz)   LMP 2024 (Exact Date)   BMI 30.83 kg/m²       Ultrasound performed. See viewpoint for full ultrasound report.  A viable bar pregnancy is visualized in cephalic presentation.  Estimated fetal weight is at the 48th percentile with an abdominal circumference at the 76th percentile.    No fetal abnormalities are noted and previous anatomic survey was normal. Amniotic fluid volume is normal (no longer polyhydramnios, SHILOH 16cm).  Placenta is posterior. Biophysical profile is 8/8.       ASSESSMENT/PLAN:     34 y.o.  female with IUP at 36w3d     Polyhydramnios affecting pregnancy  Polyhydramnios is defined as an MVP >= 8cm or SHILOH >= 24cm and complicates about 1-2% of bar pregnancies.    It can be subclassified as:  Classification SHILOH (cm) MVP (cm)   Mild 24.0-29.9 8-11   Moderate 30-34.9 12-15   Severe >= 35.0 >= 16   MVP should be used to diagnose polyhydramnios in the setting of multiple gestations. In bar pregnancy, SHILOH is used to establish the diagnosis.    The two major causes for polyhydramnios are diabetes and fetal malformations, but for most cases of mild " polyhydramnios, 60-70% is of unknown cause (idiopathic). Polyhydramnios due to an identified cause is associated with an increased risk of  delivery, small-for-gestational age infant, macrosomia, and  mortality.  Polyhydramnios, from any cause, has also been associated with increased risk of non-vertex presentation, dysfunctional labor, increased  and operative vaginal delivery rates and postpartum hemorrhage.  mortality varies depending on associated anomaly. Risk of major anomaly detected after birth, without prenatal detection, is 1% with mild, 2% with moderate and 11% with severe polyhydramnios.    Workup should include a targeted fetal anatomical ultrasound looking for associated anomalies. Additionally, a glucose screening test (repeat if it was performed >1 month prior) should be performed. If hydrops or other sonographic signs are present, a workup for fetal anemia (antibody screen) and congenital infections (TORCH titers) is warranted. Amniocentesis (with microarray) should be offered to all patients and should be strongly considered if there is severe polyhydramnios, fetal anomaly on ultrasound, growth restriction, or if detected before 24 weeks. Progression of polyhydramnios is more suggestive of an underlying structural or genetic etiology. Severe polyhydramnios carries a high likelihood of undiagnosed fetal anomalies, and we recommend delivery at a tertiary center.  Indomethacin is NOT recommended for the purpose of treating polyhydramnios.    testing is not recommended in the setting of mild idiopathic polyhydramnios.  testing should be performed for moderate and severe polyhydramnios.    2/4- Mild polyhydramnios noted. Glucola was normal.  3/5- SHILOH normal. Poly resolved.       Elevated blood pressure affecting pregnancy in third trimester, antepartum  She has elevated blood pressure in office today x2 in the mild range.  She has never had a history of  elevated blood pressure in pregnancy or out of pregnancy.  She has no symptoms at all.    I recommend she obtain outpatient preeclampsia workup at this time with urine protein assessment and serum labs.  She was given precautions to go to the hospital with any new symptoms, swelling, elevated blood pressures at home.   She will return for a blood pressure check in 1 week.  If her blood pressures remain elevated, we will recommend induction at 37 weeks.          FOLLOW UP:   F/u in 1 week for BP check/ BPP      Venita Marie  Maternal-Fetal Medicine    Electronically Signed by Venita Marie March 5, 2025

## 2025-03-05 NOTE — ASSESSMENT & PLAN NOTE
She has elevated blood pressure in office today x2 in the mild range.  She has never had a history of elevated blood pressure in pregnancy or out of pregnancy.  She has no symptoms at all.    I recommend she obtain outpatient preeclampsia workup at this time with urine protein assessment and serum labs.  She was given precautions to go to the hospital with any new symptoms, swelling, elevated blood pressures at home.   She will return for a blood pressure check in 1 week.  If her blood pressures remain elevated, we will recommend induction at 37 weeks.

## 2025-03-05 NOTE — ASSESSMENT & PLAN NOTE
Polyhydramnios is defined as an MVP >= 8cm or SHILOH >= 24cm and complicates about 1-2% of bar pregnancies.    It can be subclassified as:  Classification SHILOH (cm) MVP (cm)   Mild 24.0-29.9 8-11   Moderate 30-34.9 12-15   Severe >= 35.0 >= 16   MVP should be used to diagnose polyhydramnios in the setting of multiple gestations. In bar pregnancy, SHILOH is used to establish the diagnosis.    The two major causes for polyhydramnios are diabetes and fetal malformations, but for most cases of mild polyhydramnios, 60-70% is of unknown cause (idiopathic). Polyhydramnios due to an identified cause is associated with an increased risk of  delivery, small-for-gestational age infant, macrosomia, and  mortality.  Polyhydramnios, from any cause, has also been associated with increased risk of non-vertex presentation, dysfunctional labor, increased  and operative vaginal delivery rates and postpartum hemorrhage.  mortality varies depending on associated anomaly. Risk of major anomaly detected after birth, without prenatal detection, is 1% with mild, 2% with moderate and 11% with severe polyhydramnios.    Workup should include a targeted fetal anatomical ultrasound looking for associated anomalies. Additionally, a glucose screening test (repeat if it was performed >1 month prior) should be performed. If hydrops or other sonographic signs are present, a workup for fetal anemia (antibody screen) and congenital infections (TORCH titers) is warranted. Amniocentesis (with microarray) should be offered to all patients and should be strongly considered if there is severe polyhydramnios, fetal anomaly on ultrasound, growth restriction, or if detected before 24 weeks. Progression of polyhydramnios is more suggestive of an underlying structural or genetic etiology. Severe polyhydramnios carries a high likelihood of undiagnosed fetal anomalies, and we recommend delivery at a tertiary  [place].  Indomethacin is NOT recommended for the purpose of treating polyhydramnios.    testing is not recommended in the setting of mild idiopathic polyhydramnios.  testing should be performed for moderate and severe polyhydramnios.    2/4- Mild polyhydramnios noted. Glucola was normal.  3/5- SHILOH normal. Poly resolved.

## 2025-03-11 ENCOUNTER — OFFICE VISIT (OUTPATIENT)
Dept: FAMILY MEDICINE | Facility: CLINIC | Age: 35
End: 2025-03-11
Payer: MEDICAID

## 2025-03-11 ENCOUNTER — HOSPITAL ENCOUNTER (INPATIENT)
Facility: HOSPITAL | Age: 35
LOS: 3 days | Discharge: HOME | End: 2025-03-14
Attending: OBSTETRICS & GYNECOLOGY | Admitting: OBSTETRICS & GYNECOLOGY
Payer: MEDICAID

## 2025-03-11 VITALS
OXYGEN SATURATION: 99 % | WEIGHT: 176 LBS | HEIGHT: 63 IN | SYSTOLIC BLOOD PRESSURE: 138 MMHG | BODY MASS INDEX: 31.18 KG/M2 | RESPIRATION RATE: 16 BRPM | HEART RATE: 77 BPM | DIASTOLIC BLOOD PRESSURE: 90 MMHG | TEMPERATURE: 98 F

## 2025-03-11 DIAGNOSIS — O09.523 MULTIGRAVIDA OF ADVANCED MATERNAL AGE IN THIRD TRIMESTER: ICD-10-CM

## 2025-03-11 DIAGNOSIS — Z34.90 ENCOUNTER FOR INDUCTION OF LABOR: Primary | ICD-10-CM

## 2025-03-11 DIAGNOSIS — O40.9XX0 POLYHYDRAMNIOS AFFECTING PREGNANCY: ICD-10-CM

## 2025-03-11 DIAGNOSIS — Z3A.37 37 WEEKS GESTATION OF PREGNANCY: Primary | ICD-10-CM

## 2025-03-11 DIAGNOSIS — D50.9 MICROCYTIC ANEMIA: ICD-10-CM

## 2025-03-11 DIAGNOSIS — O13.3 GESTATIONAL HYPERTENSION, THIRD TRIMESTER: ICD-10-CM

## 2025-03-11 DIAGNOSIS — O16.3 ELEVATED BLOOD PRESSURE AFFECTING PREGNANCY IN THIRD TRIMESTER, ANTEPARTUM: ICD-10-CM

## 2025-03-11 LAB
ALBUMIN SERPL-MCNC: 3.1 G/DL (ref 3.5–5)
ALBUMIN/GLOB SERPL: 0.8 RATIO (ref 1.1–2)
ALP SERPL-CCNC: 133 UNIT/L (ref 40–150)
ALT SERPL-CCNC: 20 UNIT/L (ref 0–55)
ANION GAP SERPL CALC-SCNC: 13 MEQ/L
AST SERPL-CCNC: 20 UNIT/L (ref 5–34)
BASOPHILS # BLD AUTO: 0.04 X10(3)/MCL
BASOPHILS NFR BLD AUTO: 0.4 %
BILIRUB SERPL-MCNC: 0.2 MG/DL
BILIRUB SERPL-MCNC: NORMAL MG/DL
BLOOD URINE, POC: NORMAL
BUN SERPL-MCNC: 4.8 MG/DL (ref 7–18.7)
C TRACH DNA SPEC QL NAA+PROBE: NOT DETECTED
CALCIUM SERPL-MCNC: 9.2 MG/DL (ref 8.4–10.2)
CHLORIDE SERPL-SCNC: 108 MMOL/L (ref 98–107)
CLARITY, POC UA: CLEAR
CO2 SERPL-SCNC: 17 MMOL/L (ref 22–29)
COLOR, POC UA: NORMAL
CREAT SERPL-MCNC: 0.64 MG/DL (ref 0.55–1.02)
CREAT/UREA NIT SERPL: 8
EOSINOPHIL # BLD AUTO: 0.05 X10(3)/MCL (ref 0–0.9)
EOSINOPHIL NFR BLD AUTO: 0.5 %
ERYTHROCYTE [DISTWIDTH] IN BLOOD BY AUTOMATED COUNT: 20.2 % (ref 11.5–17)
GFR SERPLBLD CREATININE-BSD FMLA CKD-EPI: >60 ML/MIN/1.73/M2
GLOBULIN SER-MCNC: 3.7 GM/DL (ref 2.4–3.5)
GLUCOSE SERPL-MCNC: 94 MG/DL (ref 74–100)
GLUCOSE UR QL STRIP: NORMAL
GROUP & RH: NORMAL
HCT VFR BLD AUTO: 33.3 % (ref 37–47)
HGB BLD-MCNC: 10.7 G/DL (ref 12–16)
HIV 1+2 AB+HIV1 P24 AG SERPL QL IA: NONREACTIVE
IMM GRANULOCYTES # BLD AUTO: 0.07 X10(3)/MCL (ref 0–0.04)
IMM GRANULOCYTES NFR BLD AUTO: 0.7 %
INDIRECT COOMBS: NORMAL
KETONES UR QL STRIP: NORMAL
LEUKOCYTE ESTERASE URINE, POC: NORMAL
LYMPHOCYTES # BLD AUTO: 1.92 X10(3)/MCL (ref 0.6–4.6)
LYMPHOCYTES NFR BLD AUTO: 19.3 %
MCH RBC QN AUTO: 24.3 PG (ref 27–31)
MCHC RBC AUTO-ENTMCNC: 32.1 G/DL (ref 33–36)
MCV RBC AUTO: 75.7 FL (ref 80–94)
MONOCYTES # BLD AUTO: 0.47 X10(3)/MCL (ref 0.1–1.3)
MONOCYTES NFR BLD AUTO: 4.7 %
N GONORRHOEA DNA SPEC QL NAA+PROBE: NOT DETECTED
NEUTROPHILS # BLD AUTO: 7.42 X10(3)/MCL (ref 2.1–9.2)
NEUTROPHILS NFR BLD AUTO: 74.4 %
NITRITE, POC UA: NORMAL
NRBC BLD AUTO-RTO: 0 %
PH, POC UA: 7
PLATELET # BLD AUTO: 354 X10(3)/MCL (ref 130–400)
PMV BLD AUTO: 10.5 FL (ref 7.4–10.4)
POTASSIUM SERPL-SCNC: 4.2 MMOL/L (ref 3.5–5.1)
PRENATAL STREP B CULTURE: NORMAL
PROT SERPL-MCNC: 6.8 GM/DL (ref 6.4–8.3)
PROTEIN, POC: NORMAL
RBC # BLD AUTO: 4.4 X10(6)/MCL (ref 4.2–5.4)
SODIUM SERPL-SCNC: 138 MMOL/L (ref 136–145)
SOURCE (OHS): NORMAL
SPECIFIC GRAVITY, POC UA: 1.01
SPECIMEN OUTDATE: NORMAL
T PALLIDUM AB SER QL: NONREACTIVE
T PALLIDUM AB SER QL: NONREACTIVE
UROBILINOGEN, POC UA: 0.2
WBC # BLD AUTO: 9.97 X10(3)/MCL (ref 4.5–11.5)

## 2025-03-11 PROCEDURE — 80053 COMPREHEN METABOLIC PANEL: CPT | Performed by: OBSTETRICS & GYNECOLOGY

## 2025-03-11 PROCEDURE — 86900 BLOOD TYPING SEROLOGIC ABO: CPT | Performed by: OBSTETRICS & GYNECOLOGY

## 2025-03-11 PROCEDURE — 86780 TREPONEMA PALLIDUM: CPT | Performed by: OBSTETRICS & GYNECOLOGY

## 2025-03-11 PROCEDURE — 87081 CULTURE SCREEN ONLY: CPT

## 2025-03-11 PROCEDURE — 81002 URINALYSIS NONAUTO W/O SCOPE: CPT | Mod: PBBFAC

## 2025-03-11 PROCEDURE — 87591 N.GONORRHOEAE DNA AMP PROB: CPT

## 2025-03-11 PROCEDURE — 25000003 PHARM REV CODE 250: Performed by: OBSTETRICS & GYNECOLOGY

## 2025-03-11 PROCEDURE — 36415 COLL VENOUS BLD VENIPUNCTURE: CPT

## 2025-03-11 PROCEDURE — 85025 COMPLETE CBC W/AUTO DIFF WBC: CPT | Performed by: OBSTETRICS & GYNECOLOGY

## 2025-03-11 PROCEDURE — 11000001 HC ACUTE MED/SURG PRIVATE ROOM

## 2025-03-11 PROCEDURE — 87389 HIV-1 AG W/HIV-1&-2 AB AG IA: CPT

## 2025-03-11 PROCEDURE — 86780 TREPONEMA PALLIDUM: CPT

## 2025-03-11 PROCEDURE — 99213 OFFICE O/P EST LOW 20 MIN: CPT | Mod: PBBFAC

## 2025-03-11 RX ORDER — MISOPROSTOL 100 UG/1
800 TABLET ORAL ONCE AS NEEDED
Status: DISCONTINUED | OUTPATIENT
Start: 2025-03-11 | End: 2025-03-12

## 2025-03-11 RX ORDER — OXYTOCIN-SODIUM CHLORIDE 0.9% IV SOLN 30 UNIT/500ML 30-0.9/5 UT/ML-%
10 SOLUTION INTRAVENOUS ONCE AS NEEDED
Status: DISCONTINUED | OUTPATIENT
Start: 2025-03-11 | End: 2025-03-14 | Stop reason: HOSPADM

## 2025-03-11 RX ORDER — MUPIROCIN 20 MG/G
OINTMENT TOPICAL
Status: DISCONTINUED | OUTPATIENT
Start: 2025-03-11 | End: 2025-03-14 | Stop reason: HOSPADM

## 2025-03-11 RX ORDER — OXYTOCIN 10 [USP'U]/ML
10 INJECTION, SOLUTION INTRAMUSCULAR; INTRAVENOUS ONCE AS NEEDED
Status: DISCONTINUED | OUTPATIENT
Start: 2025-03-11 | End: 2025-03-12

## 2025-03-11 RX ORDER — ACETAMINOPHEN 325 MG/1
650 TABLET ORAL EVERY 6 HOURS PRN
Status: DISCONTINUED | OUTPATIENT
Start: 2025-03-11 | End: 2025-03-14 | Stop reason: HOSPADM

## 2025-03-11 RX ORDER — ONDANSETRON HYDROCHLORIDE 2 MG/ML
4 INJECTION, SOLUTION INTRAVENOUS EVERY 6 HOURS PRN
Status: DISCONTINUED | OUTPATIENT
Start: 2025-03-11 | End: 2025-03-14 | Stop reason: HOSPADM

## 2025-03-11 RX ORDER — MISOPROSTOL 100 MCG
25 TABLET ORAL EVERY 4 HOURS
Status: DISCONTINUED | OUTPATIENT
Start: 2025-03-11 | End: 2025-03-11

## 2025-03-11 RX ORDER — SODIUM CHLORIDE, SODIUM LACTATE, POTASSIUM CHLORIDE, CALCIUM CHLORIDE 600; 310; 30; 20 MG/100ML; MG/100ML; MG/100ML; MG/100ML
INJECTION, SOLUTION INTRAVENOUS CONTINUOUS
Status: DISCONTINUED | OUTPATIENT
Start: 2025-03-11 | End: 2025-03-12

## 2025-03-11 RX ORDER — OXYTOCIN-SODIUM CHLORIDE 0.9% IV SOLN 30 UNIT/500ML 30-0.9/5 UT/ML-%
95 SOLUTION INTRAVENOUS CONTINUOUS PRN
Status: DISCONTINUED | OUTPATIENT
Start: 2025-03-11 | End: 2025-03-14 | Stop reason: HOSPADM

## 2025-03-11 RX ORDER — DIPHENOXYLATE HYDROCHLORIDE AND ATROPINE SULFATE 2.5; .025 MG/1; MG/1
2 TABLET ORAL EVERY 6 HOURS PRN
Status: DISCONTINUED | OUTPATIENT
Start: 2025-03-11 | End: 2025-03-12

## 2025-03-11 RX ORDER — ONDANSETRON 4 MG/1
8 TABLET, ORALLY DISINTEGRATING ORAL EVERY 8 HOURS PRN
Status: DISCONTINUED | OUTPATIENT
Start: 2025-03-11 | End: 2025-03-12

## 2025-03-11 RX ORDER — LIDOCAINE HYDROCHLORIDE 10 MG/ML
10 INJECTION, SOLUTION INFILTRATION; PERINEURAL ONCE AS NEEDED
Status: DISCONTINUED | OUTPATIENT
Start: 2025-03-11 | End: 2025-03-12

## 2025-03-11 RX ORDER — CARBOPROST TROMETHAMINE 250 UG/ML
250 INJECTION, SOLUTION INTRAMUSCULAR
Status: DISCONTINUED | OUTPATIENT
Start: 2025-03-11 | End: 2025-03-12

## 2025-03-11 RX ORDER — MISOPROSTOL 100 MCG
25 TABLET ORAL EVERY 4 HOURS
Status: DISCONTINUED | OUTPATIENT
Start: 2025-03-11 | End: 2025-03-12

## 2025-03-11 RX ORDER — SIMETHICONE 80 MG
1 TABLET,CHEWABLE ORAL 4 TIMES DAILY PRN
Status: DISCONTINUED | OUTPATIENT
Start: 2025-03-11 | End: 2025-03-12

## 2025-03-11 RX ORDER — CALCIUM CARBONATE 200(500)MG
500 TABLET,CHEWABLE ORAL 3 TIMES DAILY PRN
Status: DISCONTINUED | OUTPATIENT
Start: 2025-03-11 | End: 2025-03-14 | Stop reason: HOSPADM

## 2025-03-11 RX ORDER — METHYLERGONOVINE MALEATE 0.2 MG/ML
200 INJECTION INTRAVENOUS ONCE AS NEEDED
Status: DISCONTINUED | OUTPATIENT
Start: 2025-03-11 | End: 2025-03-14 | Stop reason: HOSPADM

## 2025-03-11 RX ORDER — OXYTOCIN-SODIUM CHLORIDE 0.9% IV SOLN 30 UNIT/500ML 30-0.9/5 UT/ML-%
10 SOLUTION INTRAVENOUS ONCE AS NEEDED
Status: DISCONTINUED | OUTPATIENT
Start: 2025-03-11 | End: 2025-03-12

## 2025-03-11 RX ORDER — OXYTOCIN-SODIUM CHLORIDE 0.9% IV SOLN 30 UNIT/500ML 30-0.9/5 UT/ML-%
95 SOLUTION INTRAVENOUS ONCE AS NEEDED
Status: COMPLETED | OUTPATIENT
Start: 2025-03-11 | End: 2025-03-12

## 2025-03-11 RX ADMIN — MISOPROSTOL 25 MCG: 100 TABLET ORAL at 07:03

## 2025-03-11 NOTE — PROGRESS NOTES
[facility] OB OFFICE VISIT NOTE     Primary PCP:     Chief Complaint     Tiff Carrero is a 34 y.o. female   37w2d 3/30/2025, by Ultrasound presenting to [facility] for routine prenatal visit.     # [id number] used for entirety of this visit.    HPI:  Reports good fetal movement.  Denies VB, vaginal discharge, loss of fluid, contractions.   Blood pressure in clinic today 138/90.  Of note, BP was also elevated at last M visit on 3/5/2025 and patient was scheduled for BP check in 1 week with recommendation for induction at 37 weeks if blood pressures were still elevated.  Patient does not check BP at home. Pre-E labs done on 3/5/2025 were all WNL. She denies any HA, blurry vision, lightheadedness, dizziness, chest pain, SOB.       Relevant PMH:    - Anemia  - Polyhydramnios (resolved)    Obstetrics History     OB History    Para Term  AB Living   7 4 4 0 2 4   SAB IAB Ectopic Multiple Live Births   2 0 0 0 4      # Outcome Date GA Lbr Myke/2nd Weight Sex Type Anes PTL Lv   7 Current            6 SAB 2024 6w0d    SAB None  FD   5 SAB 2023 8w0d    SAB None  FD   4 Term 14 40w0d  3.629 kg (8 lb) M Vag-Spont None N RAKESH   3 Term 12 40w0d  3.175 kg (7 lb) F Vag-Spont None N RAKESH   2 Term 10/07/09 40w0d  3.175 kg (7 lb) M Vag-Spont None N RAKESH   1 Term 05 40w0d  3.175 kg (7 lb) M Vag-Spont None N RAKESH      Gynecology History   - LMP: 2024   - Age at menarche: 13 years  - Menstrual hx: regular, 30 day cycles, 4-5 pads/day, 3-4 days per period   - History of birth control: OCP (estrogen/progesterone), Depo-Provera and Nexplanon   - History of STDs and/or Abnormal PAPs: No hx of STDs. ASCUS w/ pos HR-HPV (2024), NIL with neg HPV (2024)   - History of prior : No    Medical History    Past Medical History: As above   Surgical History: D&C post spontaneous miscarriage (2024)   Family History: No pertinent hx  Social History: Denies  "smoking, drinking, and recreational drug use   Medications: PNVs    Review of Systems     Antepartum specific   - Fetal movements: yes   - Vaginal bleeding: no  - Vaginal discharge: no  - Loss of fluid: no  - Contractions: no  - Headaches: no  - Vision changes: no  - Edema: no    Vital Signs     Vitals:    03/11/25 1255   BP: (!) 138/90   BP Location: Left arm   Patient Position: Sitting   Pulse: 77   Resp: 16   Temp: 98.2 °F (36.8 °C)   TempSrc: Oral   SpO2: 99%   Weight: 79.8 kg (176 lb)   Height: 5' 3" (1.6 m)       Physical Exam   General: in no acute distress  RESP: clear to auscultation bilaterally, non labored  CV: regular rate and rhythm, no murmurs, no edema  ABD: non-tender, BS+  FHTs: 135 bpm via Doppler  Fundal height: 38 cm    Current Medications:      Current Outpatient Medications   Medication Instructions    PNV,calcium 72-iron-folic acid (PRENATAL VITAMIN PLUS LOW IRON) 27 mg iron- 1 mg Tab 1 each, Oral, Daily       Labs   Urine dipstick:   Lab Results   Component Value Date    COLORU Light Yellow 03/11/2025    SPECGRAV 1.010 03/11/2025    PHUR 7.0 03/11/2025    WBCUR neg 03/11/2025    NITRITE neg 03/11/2025    PROTEINPOC neg 03/11/2025    GLUCOSEUR neg 03/11/2025    KETONESU neg 03/11/2025    UROBILINOGEN 0.2 03/11/2025    BILIRUBINPOC neg 03/11/2025    RBCUR neg 03/11/2025       Initial OB Labs: (Date(9/5/2024)  - Blood Type and Rh: O Pos  - Antibody Screen: Neg  CBC H/H: 11/34.3  - HIV: NR  - Syphilis Ab (RPR): NR  - GC: Neg  - CT: Neg  - HBsAg: NR  - HCVAb: NR  - Rubella: Immune  - Varicella: Immune  - UA & Culture: NG  - Sickle Cell Screen: Neg  - PAP: NIL, HRHPV neg   - Influenza vaccine date: 10/2/2024  - BTL desired: No     15-20 Weeks Lab: (10/2/2024)   - Quad Screen: Normal risk     28 Week Lab: (1/9/2025)  - 1H GTT: 108  - Rhogam (N/A if not applicable): N/A  - Date of Tdap: 1/9/2025  - CBC H/H: 9.5/30.1  - Syphilis Ab: NR  - BTL consent: N/A    37 Week Labs: (Date 3/11/2025)  - CBC H/H: " 10..5  - RPR:   - GBS Culture:    - HIV:   - Cervical GC:       38 Weeks:  -Cervical Exam:       39 Weeks:  -Cervical Exam:  -NST Exam:    FM Continuity Patient: no  Previous : no    Scheduled delivery: induction or  (can NOT be scheduled any earlier than 39w0d):    Imaging    Initial US:   2024  Single viable intrauterine pregnancy with a crown-rump length indicative of a 10 week 4 day gestation. Small subchorionic bleed measuring 1.3 x 0.6 x 1.3 cm     Anatomy Scan:  12/10/2024  A viable bar pregnancy is visualized in cephalic presentation. Estimated fetal weight is at the 70th percentile with an abdominal circumference at the 97th percentile (growth is accelerated). No fetal abnormalities are noted and anatomic survey is complete. Amniotic fluid volume is mildly increased (borderline polyhydramnios). Placenta is left, lateral, fundal.      2025  A viable bar pregnancy is visualized in cephalic presentation. Estimated fetal weight is at the 39th percentile with an abdominal circumference at the 83rd percentile. No fetal abnormalities are noted and previous anatomic survey is complete. Mild polyhydramnios is noted, measuring 24cm. Placenta is left, lateral. Biophysical profile is 8/8.     3/5/2025  A viable bar pregnancy is visualized in cephalic presentation. Estimated fetal weight is at the 48th percentile with an abdominal circumference at the 76th percentile.   No fetal abnormalities are noted and previous anatomic survey was normal. Amniotic fluid volume is normal (no longer polyhydramnios, SHILOH 16cm). Placenta is posterior.   Biophysical profile is 8/8.     Assessment     1. 37 weeks gestation of pregnancy    2. Gestational hypertension, third trimester    3. Microcytic anemia    4. Multigravida of advanced maternal age in third trimester        Plan     37 weeks gestation of pregnancy  - OB Protocol discussed  - PNVs  - Urine dip reviewed as above  - Routine  labs: 37 week labs  - Mother plans to breast and bottle feed  - Postpartum contraception discussion: Nexplanon  - ED precautions discussed: vaginal bleeding or leaking fluid, belly cramping or pain, SOB/chest pain, swelling of the face/lower extremities, vision changes. If don't feel the baby move in over an hour. Severe headache that are not resolved with medication       Gestational HTN  - Blood pressure was elevated at M appointment on 03/05 and in HR OB clinic today.  Initial BP 140s over 80s.  Repeat 138/90.  - Preeclampsia labs done on 03/05 were all WNL - plts 357, Cr/BUN 0.65/6.4, AST/ALT 17/19, uric acid and LDH WNL.  Urine protein level nml.   - Staff contacted [facility] who recommended that patient be sent to [facility] for labor induction today. Discussed this with patient who voiced understanding. Called [facility] & D and they will be expecting patient.      Polyhydramnios affecting pregnancy  - Followed by [facility].  Of note, polyhydramnios resolved on ultrasound from 03/05/2025.      Microcytic anemia  - H/H noted to be 10.2/33.5 on last CBC from 3/5/2025  - Iron panel (2/7/2025): Iron 29, TIBC 624, Transferrin 576, and Ferritin 3.46  - Iron supplements       Hx of ASCUS, HR HPV+ (other) in 6/2024  - PAP (9/2024): NIL and  HR HPV negative  - Has postpartum Gyn appt 6/16/25 for colposcopy      Return to clinic in 1 week.        Alla Kaiser MD  \[facility]\"" Family Medicine Resident, [facility]

## 2025-03-12 ENCOUNTER — ANESTHESIA (OUTPATIENT)
Dept: OBSTETRICS AND GYNECOLOGY | Facility: HOSPITAL | Age: 35
End: 2025-03-12
Payer: MEDICAID

## 2025-03-12 ENCOUNTER — ANESTHESIA EVENT (OUTPATIENT)
Dept: OBSTETRICS AND GYNECOLOGY | Facility: HOSPITAL | Age: 35
End: 2025-03-12
Payer: MEDICAID

## 2025-03-12 PROCEDURE — 10907ZC DRAINAGE OF AMNIOTIC FLUID, THERAPEUTIC FROM PRODUCTS OF CONCEPTION, VIA NATURAL OR ARTIFICIAL OPENING: ICD-10-PCS | Performed by: OBSTETRICS & GYNECOLOGY

## 2025-03-12 PROCEDURE — 63600175 PHARM REV CODE 636 W HCPCS: Performed by: OBSTETRICS & GYNECOLOGY

## 2025-03-12 PROCEDURE — 3E0P7VZ INTRODUCTION OF HORMONE INTO FEMALE REPRODUCTIVE, VIA NATURAL OR ARTIFICIAL OPENING: ICD-10-PCS | Performed by: OBSTETRICS & GYNECOLOGY

## 2025-03-12 PROCEDURE — 11000001 HC ACUTE MED/SURG PRIVATE ROOM

## 2025-03-12 PROCEDURE — 63600175 PHARM REV CODE 636 W HCPCS: Performed by: ANESTHESIOLOGY

## 2025-03-12 PROCEDURE — 72100003 HC LABOR CARE, EA. ADDL. 8 HRS

## 2025-03-12 PROCEDURE — 72200005 HC VAGINAL DELIVERY LEVEL II

## 2025-03-12 PROCEDURE — 72100002 HC LABOR CARE, 1ST 8 HOURS

## 2025-03-12 PROCEDURE — 62326 NJX INTERLAMINAR LMBR/SAC: CPT | Performed by: ANESTHESIOLOGY

## 2025-03-12 PROCEDURE — 37000008 HC ANESTHESIA 1ST 15 MINUTES: Performed by: ANESTHESIOLOGY

## 2025-03-12 PROCEDURE — 25000003 PHARM REV CODE 250: Performed by: OBSTETRICS & GYNECOLOGY

## 2025-03-12 PROCEDURE — 37000009 HC ANESTHESIA EA ADD 15 MINS: Performed by: ANESTHESIOLOGY

## 2025-03-12 PROCEDURE — 51702 INSERT TEMP BLADDER CATH: CPT

## 2025-03-12 PROCEDURE — 63600175 PHARM REV CODE 636 W HCPCS

## 2025-03-12 RX ORDER — DOCUSATE SODIUM 100 MG/1
200 CAPSULE, LIQUID FILLED ORAL 2 TIMES DAILY PRN
Status: DISCONTINUED | OUTPATIENT
Start: 2025-03-12 | End: 2025-03-14 | Stop reason: HOSPADM

## 2025-03-12 RX ORDER — HYDROCODONE BITARTRATE AND ACETAMINOPHEN 5; 325 MG/1; MG/1
1 TABLET ORAL EVERY 4 HOURS PRN
Status: DISCONTINUED | OUTPATIENT
Start: 2025-03-12 | End: 2025-03-14 | Stop reason: HOSPADM

## 2025-03-12 RX ORDER — MISOPROSTOL 100 UG/1
800 TABLET ORAL ONCE AS NEEDED
Status: DISCONTINUED | OUTPATIENT
Start: 2025-03-12 | End: 2025-03-14 | Stop reason: HOSPADM

## 2025-03-12 RX ORDER — IBUPROFEN 600 MG/1
600 TABLET ORAL EVERY 6 HOURS
Status: DISCONTINUED | OUTPATIENT
Start: 2025-03-12 | End: 2025-03-14 | Stop reason: HOSPADM

## 2025-03-12 RX ORDER — METHYLERGONOVINE MALEATE 0.2 MG/ML
200 INJECTION INTRAVENOUS ONCE AS NEEDED
Status: DISCONTINUED | OUTPATIENT
Start: 2025-03-12 | End: 2025-03-12

## 2025-03-12 RX ORDER — ONDANSETRON 4 MG/1
8 TABLET, ORALLY DISINTEGRATING ORAL EVERY 8 HOURS PRN
Status: DISCONTINUED | OUTPATIENT
Start: 2025-03-12 | End: 2025-03-14 | Stop reason: HOSPADM

## 2025-03-12 RX ORDER — OXYTOCIN-SODIUM CHLORIDE 0.9% IV SOLN 30 UNIT/500ML 30-0.9/5 UT/ML-%
10 SOLUTION INTRAVENOUS ONCE AS NEEDED
Status: DISCONTINUED | OUTPATIENT
Start: 2025-03-12 | End: 2025-03-12

## 2025-03-12 RX ORDER — EPHEDRINE SULFATE 50 MG/ML
10 INJECTION, SOLUTION INTRAVENOUS
OUTPATIENT
Start: 2025-03-12 | End: 2025-03-12

## 2025-03-12 RX ORDER — PENICILLIN G 3000000 [IU]/50ML
3 INJECTION, SOLUTION INTRAVENOUS
Status: DISCONTINUED | OUTPATIENT
Start: 2025-03-12 | End: 2025-03-12

## 2025-03-12 RX ORDER — OXYTOCIN-SODIUM CHLORIDE 0.9% IV SOLN 30 UNIT/500ML 30-0.9/5 UT/ML-%
95 SOLUTION INTRAVENOUS ONCE AS NEEDED
Status: DISCONTINUED | OUTPATIENT
Start: 2025-03-12 | End: 2025-03-14 | Stop reason: HOSPADM

## 2025-03-12 RX ORDER — SODIUM CITRATE AND CITRIC ACID MONOHYDRATE 334; 500 MG/5ML; MG/5ML
30 SOLUTION ORAL ONCE
OUTPATIENT
Start: 2025-03-12 | End: 2025-03-12

## 2025-03-12 RX ORDER — IBUPROFEN 600 MG/1
600 TABLET ORAL EVERY 6 HOURS
Status: DISCONTINUED | OUTPATIENT
Start: 2025-03-13 | End: 2025-03-12

## 2025-03-12 RX ORDER — OXYTOCIN 10 [USP'U]/ML
10 INJECTION, SOLUTION INTRAMUSCULAR; INTRAVENOUS ONCE AS NEEDED
Status: DISCONTINUED | OUTPATIENT
Start: 2025-03-12 | End: 2025-03-14 | Stop reason: HOSPADM

## 2025-03-12 RX ORDER — SIMETHICONE 80 MG
1 TABLET,CHEWABLE ORAL EVERY 6 HOURS PRN
Status: DISCONTINUED | OUTPATIENT
Start: 2025-03-12 | End: 2025-03-14 | Stop reason: HOSPADM

## 2025-03-12 RX ORDER — CARBOPROST TROMETHAMINE 250 UG/ML
250 INJECTION, SOLUTION INTRAMUSCULAR
Status: DISCONTINUED | OUTPATIENT
Start: 2025-03-12 | End: 2025-03-14 | Stop reason: HOSPADM

## 2025-03-12 RX ORDER — DIPHENHYDRAMINE HCL 25 MG
25 CAPSULE ORAL EVERY 4 HOURS PRN
Status: DISCONTINUED | OUTPATIENT
Start: 2025-03-12 | End: 2025-03-14 | Stop reason: HOSPADM

## 2025-03-12 RX ORDER — DIPHENOXYLATE HYDROCHLORIDE AND ATROPINE SULFATE 2.5; .025 MG/1; MG/1
2 TABLET ORAL EVERY 6 HOURS PRN
Status: DISCONTINUED | OUTPATIENT
Start: 2025-03-12 | End: 2025-03-14 | Stop reason: HOSPADM

## 2025-03-12 RX ORDER — ACETAMINOPHEN 325 MG/1
650 TABLET ORAL EVERY 6 HOURS PRN
Status: DISCONTINUED | OUTPATIENT
Start: 2025-03-12 | End: 2025-03-12

## 2025-03-12 RX ORDER — OXYTOCIN-SODIUM CHLORIDE 0.9% IV SOLN 30 UNIT/500ML 30-0.9/5 UT/ML-%
95 SOLUTION INTRAVENOUS CONTINUOUS PRN
Status: DISCONTINUED | OUTPATIENT
Start: 2025-03-12 | End: 2025-03-12

## 2025-03-12 RX ORDER — ROPIVACAINE HYDROCHLORIDE 2 MG/ML
INJECTION, SOLUTION EPIDURAL; INFILTRATION CONTINUOUS PRN
Status: DISCONTINUED | OUTPATIENT
Start: 2025-03-12 | End: 2025-03-12

## 2025-03-12 RX ORDER — OXYTOCIN-SODIUM CHLORIDE 0.9% IV SOLN 30 UNIT/500ML 30-0.9/5 UT/ML-%
0-32 SOLUTION INTRAVENOUS CONTINUOUS
Status: DISCONTINUED | OUTPATIENT
Start: 2025-03-12 | End: 2025-03-12 | Stop reason: SDUPTHER

## 2025-03-12 RX ORDER — DEXTROSE, SODIUM CHLORIDE, SODIUM LACTATE, POTASSIUM CHLORIDE, AND CALCIUM CHLORIDE 5; .6; .31; .03; .02 G/100ML; G/100ML; G/100ML; G/100ML; G/100ML
INJECTION, SOLUTION INTRAVENOUS CONTINUOUS
Status: DISCONTINUED | OUTPATIENT
Start: 2025-03-12 | End: 2025-03-14

## 2025-03-12 RX ORDER — DIPHENHYDRAMINE HYDROCHLORIDE 50 MG/ML
25 INJECTION, SOLUTION INTRAMUSCULAR; INTRAVENOUS EVERY 4 HOURS PRN
Status: DISCONTINUED | OUTPATIENT
Start: 2025-03-12 | End: 2025-03-14 | Stop reason: HOSPADM

## 2025-03-12 RX ORDER — OXYTOCIN-SODIUM CHLORIDE 0.9% IV SOLN 30 UNIT/500ML 30-0.9/5 UT/ML-%
0-32 SOLUTION INTRAVENOUS CONTINUOUS
Status: DISCONTINUED | OUTPATIENT
Start: 2025-03-12 | End: 2025-03-12

## 2025-03-12 RX ORDER — HYDROCORTISONE 25 MG/G
CREAM TOPICAL 3 TIMES DAILY PRN
Status: DISCONTINUED | OUTPATIENT
Start: 2025-03-12 | End: 2025-03-14 | Stop reason: HOSPADM

## 2025-03-12 RX ORDER — FENTANYL/BUPIVACAINE/NS/PF 2-1250MCG
PLASTIC BAG, INJECTION (ML) INJECTION CONTINUOUS
Refills: 0 | Status: DISCONTINUED | OUTPATIENT
Start: 2025-03-12 | End: 2025-03-14 | Stop reason: HOSPADM

## 2025-03-12 RX ORDER — PRENATAL WITH FERROUS FUM AND FOLIC ACID 3080; 920; 120; 400; 22; 1.84; 3; 20; 10; 1; 12; 200; 27; 25; 2 [IU]/1; [IU]/1; MG/1; [IU]/1; MG/1; MG/1; MG/1; MG/1; MG/1; MG/1; UG/1; MG/1; MG/1; MG/1; MG/1
1 TABLET ORAL DAILY
Status: DISCONTINUED | OUTPATIENT
Start: 2025-03-13 | End: 2025-03-14 | Stop reason: HOSPADM

## 2025-03-12 RX ORDER — SODIUM CHLORIDE 0.9 % (FLUSH) 0.9 %
2 SYRINGE (ML) INJECTION EVERY 6 HOURS PRN
Status: DISCONTINUED | OUTPATIENT
Start: 2025-03-12 | End: 2025-03-14 | Stop reason: HOSPADM

## 2025-03-12 RX ADMIN — IBUPROFEN 600 MG: 600 TABLET, FILM COATED ORAL at 09:03

## 2025-03-12 RX ADMIN — Medication 2 MILLI-UNITS/MIN: at 02:03

## 2025-03-12 RX ADMIN — MISOPROSTOL 25 MCG: 100 TABLET ORAL at 12:03

## 2025-03-12 RX ADMIN — MISOPROSTOL 25 MCG: 100 TABLET ORAL at 08:03

## 2025-03-12 RX ADMIN — PENICILLIN G 3 MILLION UNITS: 3000000 INJECTION, SOLUTION INTRAVENOUS at 02:03

## 2025-03-12 RX ADMIN — OXYTOCIN-SODIUM CHLORIDE 0.9% IV SOLN 30 UNIT/500ML 95 MILLI-UNITS/MIN: 30-0.9/5 SOLUTION at 07:03

## 2025-03-12 RX ADMIN — MISOPROSTOL 25 MCG: 100 TABLET ORAL at 04:03

## 2025-03-12 RX ADMIN — SODIUM CHLORIDE, POTASSIUM CHLORIDE, SODIUM LACTATE AND CALCIUM CHLORIDE: 600; 310; 30; 20 INJECTION, SOLUTION INTRAVENOUS at 04:03

## 2025-03-12 RX ADMIN — ROPIVACAINE HYDROCHLORIDE 12 ML/HR: 2 INJECTION, SOLUTION EPIDURAL; INFILTRATION at 05:03

## 2025-03-12 RX ADMIN — PENICILLIN G 3 MILLION UNITS: 3000000 INJECTION, SOLUTION INTRAVENOUS at 07:03

## 2025-03-12 RX ADMIN — SODIUM CHLORIDE 5 MILLION UNITS: 900 INJECTION INTRAVENOUS at 11:03

## 2025-03-12 RX ADMIN — SODIUM CHLORIDE, POTASSIUM CHLORIDE, SODIUM LACTATE AND CALCIUM CHLORIDE: 600; 310; 30; 20 INJECTION, SOLUTION INTRAVENOUS at 10:03

## 2025-03-12 NOTE — H&P
"   HISTORY AND PHYSICAL                                                OBSTETRICS          Subjective:      Tiff Carrero is a 34 y.o.  female with IUP at 37w3d weeks gestation who presents to L&D for induction of labor. Pertinent medical history for this pregnancy includes Gestational HTN, AMA, Hx of ASCUS, and polyhydramnios (resolved).  She denies contractions, vaginal bleeding, leakage of fluid, and decreased fetal movement.  Care this pregnancy has been with [facility] as well as [facility].    PMHx:   Past Medical History:   Diagnosis Date    Abnormal Pap smear of cervix     Anemia        PSHx: History reviewed. No pertinent surgical history.    All: Review of patient's allergies indicates:  No Known Allergies    Meds: Prescriptions Prior to Admission[1]    SH: Social History[2]    FH:   Family History   Problem Relation Name Age of Onset    No Known Problems Father      No Known Problems Mother      No Known Problems Brother      No Known Problems Sister         OBHx:   OB History    Para Term  AB Living   7 4 4 0 2 4   SAB IAB Ectopic Multiple Live Births   2 0 0 0 4      # Outcome Date GA Lbr Myke/2nd Weight Sex Type Anes PTL Lv   7 Current            6 SAB 2024 6w0d    SAB None  FD   5 SAB 2023 8w0d    SAB None  FD   4 Term 14 40w0d  3.629 kg (8 lb) M Vag-Spont None N RAKESH   3 Term 12 40w0d  3.175 kg (7 lb) F Vag-Spont None N RAEKSH   2 Term 10/07/09 40w0d  3.175 kg (7 lb) M Vag-Spont None N RAKESH   1 Term 05 40w0d  3.175 kg (7 lb) M Vag-Spont None N RAKESH       Objective:      /80   Pulse 70   Temp 99 °F (37.2 °C)   Resp 17   Ht 5' 3" (1.6 m)   Wt 79.8 kg (176 lb)   LMP 2024 (Exact Date)   SpO2 100%   Breastfeeding Yes   BMI 31.18 kg/m²   Body mass index is 31.18 kg/m².    General:   alert and cooperative   HEENT:  normocephalic, atraumatic   Lungs:   clear to auscultation bilaterally   Heart:   regular rate and rhythm, S1, S2 normal   Abdomen:  " gravid, non-tender   Extremities non-tender, no edema   Derm: no rashes or lesions   Psych: appropriate mood and affect   Pelvis:  adequate       EFM: Cat 1, 140s modBTV, +accel, no decel (reassuring, reactive)  TOCO: Contractions q2-5 minutes     SVE (PeriWATCH)  Dilation (cm): 2  Effacement (%): 50  Station: -3  Cervical Position: Posterior  Cervical Consistency: Soft  Examined by:: Maryann Vizcaino  Castaneda Score: 4  Simplified Castaneda Score: 2     Lab Review  Blood Type O POS  GBBS: unknown   Rubella:  immune  RPR: NR  HIV: NR  HepB:   Hep BsAg Interp   Date Value Ref Range Status   2024 Nonreactive Nonreactive Final        Lab Results   Component Value Date    WBC 9.97 2025    HGB 10.7 (L) 2025    HCT 33.3 (L) 2025    MCV 75.7 (L) 2025     2025           Assessment:     34 y.o.  at 37w3d weeks gestation.      Active Hospital Problems    Diagnosis  POA    *Elevated blood pressure affecting pregnancy in third trimester, antepartum [O16.3]  Yes    Polyhydramnios affecting pregnancy [O40.9XX0]  Yes      Resolved Hospital Problems   No resolved problems to display.          Plan:     1. Risks, benefits, alternatives and possible complications have been discussed in detail with the patient. All questions have been answered, and Ms. Carrie Carrero has voiced understanding and agrees to the treatment plan.  2. Consents signed and in chart  3. Admit to Labor and Delivery unit  4. Plan to give prophylaxis tx due to GBS status being unknown  5. Plan for induction of labor      : [id number]    Aravind Graham MD  OB/GYN Hospitalist  4:43 AM          [1]   Medications Prior to Admission   Medication Sig Dispense Refill Last Dose/Taking    PNV,calcium 72-iron-folic acid (PRENATAL VITAMIN PLUS LOW IRON) 27 mg iron- 1 mg Tab Take 1 tablet (1 each total) by mouth once daily. 30 tablet 11    [2]   Social History  Socioeconomic History    Marital status:     Tobacco Use    Smoking status: Never     Passive exposure: Never    Smokeless tobacco: Never   Substance and Sexual Activity    Alcohol use: Never    Drug use: Never    Sexual activity: Yes     Partners: Male     Social Drivers of Health     Financial Resource Strain: Low Risk  (3/11/2025)    Overall Financial Resource Strain (CARDIA)     Difficulty of Paying Living Expenses: Not very hard   Food Insecurity: No Food Insecurity (3/11/2025)    Hunger Vital Sign     Worried About Running Out of Food in the Last Year: Never true     Ran Out of Food in the Last Year: Never true   Transportation Needs: No Transportation Needs (9/5/2024)    TRANSPORTATION NEEDS     Transportation : No   Physical Activity: Inactive (9/5/2024)    Exercise Vital Sign     Days of Exercise per Week: 0 days     Minutes of Exercise per Session: 0 min   Stress: Stress Concern Present (3/11/2025)    [employer] - Occupational Stress Questionnaire     Feeling of Stress : To some extent   Housing Stability: Low Risk  (3/11/2025)    Housing Stability Vital Sign     Unable to Pay for Housing in the Last Year: No     Homeless in the Last Year: No

## 2025-03-12 NOTE — ANESTHESIA PROCEDURE NOTES
Epidural    Patient location during procedure: OB   Reason for block: primary anesthetic   Reason for block: labor analgesia requested by patient and obstetrician  Diagnosis: Labor pain relief   Start time: 3/12/2025 4:58 PM  Timeout: 3/12/2025 4:44 PM  End time: 3/12/2025 4:58 PM    Staffing  Performing Provider: Logan Marmolejo MD  Authorizing Provider: Logan Marmolejo MD    Staffing  Performed by: Logan Marmolejo MD  Authorized by: Logan Marmolejo MD        Preanesthetic Checklist  Completed: patient identified, IV checked, site marked, risks and benefits discussed, surgical consent, monitors and equipment checked, pre-op evaluation, timeout performed, anesthesia consent given, hand hygiene performed and patient being monitored  Preparation  Patient position: sitting (Mask worn, sterile gloves worn)  Prep: ChloraPrep and Pt preloaded with 500 to 1000ml of crystalloid  Patient monitoring: Pulse Ox (Pre & Post procedure vitals & FHR are recorded by the nurse Q 5mins as appropriate)  Reason for block: primary anesthetic   Epidural  Skin Anesthetic: lidocaine 1% (25G 1.5inch needle)  Skin Wheal: 5 mL  Administration type: continuous  Approach: midline  Interspace: L3-4    Injection technique: HO saline  Needle and Epidural Catheter  Needle type: Tuohy   Needle gauge: 17  Needle length: 3.5 inches  Needle insertion depth: 6 cm  Catheter type: springwound and multi-orifice  Catheter size: 18 G  Catheter at skin depth: 13 cm  Insertion Attempts: 1  Test dose: 3 mL of lidocaine 1.5% with Epi 1-to-200,000 (Test dose given via epidural catheter)  Additional Documentation: negative aspiration for heme and CSF, no paresthesia on injection, no significant pain on injection, incremental injection, no signs/symptoms of IV or SA injection and no significant complaints from patient (Epidural catheter connected to epidural pump with filter in situ and pt instructed on its use.  Warning label placed on epidural  catheter.)  Needle localization: anatomical landmarks  Assessment  Ease of block: easy  Patient's tolerance of the procedure: comfortable throughout block (Pt returned to supine position with head of bed elevated 30 degrees and NAM applied as needed)  Additional Notes    RONNIE pump set to give initial 10ml bolus followed by 12ml/hr with bolus of 2ml, lockout interval 15mins, max 4 boluses/hr, Infusion conc Ropiv 0.2%, Improvement in pain relief documented by L&D nurses  L&D nurses instructed to call if no relief at all after 30 mins No inadvertent dural puncture with Tuohy.  Dural puncture not performed with spinal needle

## 2025-03-12 NOTE — PLAN OF CARE
Problem: Adult Inpatient Plan of Care  Goal: Plan of Care Review  Outcome: Progressing  Flowsheets (Taken 3/12/2025 1801)  Plan of Care Reviewed With:   patient   sibling  Goal: Optimal Comfort and Wellbeing  Outcome: Progressing     Problem: Labor  Goal: Stable Fetal Wellbeing  Outcome: Progressing  Goal: Effective Progression to Delivery  Outcome: Progressing  Goal: Absence of Infection Signs and Symptoms  Outcome: Progressing  Goal: Acceptable Pain Control  Outcome: Progressing  Goal: Normal Uterine Contraction Pattern  Outcome: Progressing      5

## 2025-03-12 NOTE — ANESTHESIA PREPROCEDURE EVALUATION
"03/12/2025    Tiff Carrero is a 34 y.o., female.    Pre-op Assessment    I have reviewed the Patient Summary Reports.     I have reviewed the Nursing Notes. I have reviewed the NPO Status.   I have reviewed the Medications.     Review of Systems  Anesthesia Hx:  No problems with previous Anesthesia                Cardiovascular:  Exercise tolerance: good                                                 Pre-operative evaluation for * No surgery found *    BP (!) 141/90   Pulse 65   Temp 37 °C (98.6 °F)   Resp 17   Ht 5' 3" (1.6 m)   Wt 79.8 kg (176 lb)   LMP 06/14/2024 (Exact Date)   SpO2 99%   Breastfeeding Yes   BMI 31.18 kg/m²     Past Medical History:   Diagnosis Date    Abnormal Pap smear of cervix     Anemia        Problem List[1]    Review of patient's allergies indicates:  No Known Allergies    Current Outpatient Medications   Medication Instructions    PNV,calcium 72-iron-folic acid (PRENATAL VITAMIN PLUS LOW IRON) 27 mg iron- 1 mg Tab 1 each, Oral, Daily       History reviewed. No pertinent surgical history.    Social History[2]      Physical Exam  General: Well nourished and Cooperative    Airway:  Mallampati: II   Mouth Opening: Normal  TM Distance: Normal  Tongue: Normal  Neck ROM: Normal ROM    Chest/Lungs:  Clear to auscultation    Heart:  Rhythm: Regular Rhythm        Lab Results   Component Value Date    WBC 9.97 03/11/2025    HGB 10.7 (L) 03/11/2025    HCT 33.3 (L) 03/11/2025    MCV 75.7 (L) 03/11/2025     03/11/2025          BMP  Lab Results   Component Value Date    HCT 33.3 (L) 03/11/2025     03/11/2025    K 4.2 03/11/2025    BUN 4.8 (L) 03/11/2025    CREATININE 0.64 03/11/2025    CALCIUM 9.2 03/11/2025        INR  No results for input(s): "PT", "INR", "PROTIME", "APTT" in the last 72 hours.      Diagnostic Studies:  .  EKG  No results found for this or any previous visit.      Anesthesia Plan  Type of Anesthesia, risks & benefits discussed:    Anesthesia Type: " Regional  Intra-op Monitoring Plan: Standard ASA Monitors  Post Op Pain Control Plan: multimodal analgesia  Informed Consent: Informed consent signed with the Patient and all parties understand the risks and agree with anesthesia plan.  All questions answered.   ASA Score: 2  Day of Surgery Review of History & Physical: H&P Update referred to the surgeon/provider.    Ready For Surgery From Anesthesia Perspective.     .  Anesthesia consent includes material facts, risks, complications & alternatives, and possibility of altering the anesthesia plan due to intraoperative conditions.    I reviewed problem list, prior to admission medication list, appropriate labs, any workup, Xray, EKG etc   See anesthesia chart for details of the anesthesia plan carried out.       Logan Marmolejo MD    ¤              [1]   Patient Active Problem List  Diagnosis    Missed     Anemia    Polyhydramnios affecting pregnancy    Elevated blood pressure affecting pregnancy in third trimester, antepartum   [2]   Social History  Socioeconomic History    Marital status:    Tobacco Use    Smoking status: Never     Passive exposure: Never    Smokeless tobacco: Never   Substance and Sexual Activity    Alcohol use: Never    Drug use: Never    Sexual activity: Yes     Partners: Male     Social Drivers of Health     Financial Resource Strain: Low Risk  (3/11/2025)    Overall Financial Resource Strain (CARDIA)     Difficulty of Paying Living Expenses: Not very hard   Food Insecurity: No Food Insecurity (3/11/2025)    Hunger Vital Sign     Worried About Running Out of Food in the Last Year: Never true     Ran Out of Food in the Last Year: Never true   Transportation Needs: No Transportation Needs (2024)    TRANSPORTATION NEEDS     Transportation : No   Physical Activity: Inactive (2024)    Exercise Vital Sign     Days of Exercise per Week: 0 days     Minutes of Exercise per Session: 0 min   Stress: Stress Concern Present  (3/11/2025)    [employer] - Occupational Stress Questionnaire     Feeling of Stress : To some extent   Housing Stability: Low Risk  (3/11/2025)    Housing Stability Vital Sign     Unable to Pay for Housing in the Last Year: No     Homeless in the Last Year: No

## 2025-03-12 NOTE — PROGRESS NOTES
LABOR PROGRESS NOTE:     34 y.o.  @ 37w3d here for IOL in the setting of GHTN     Temp:  [97.4 °F (36.3 °C)-99 °F (37.2 °C)] 99 °F (37.2 °C)  Pulse:  [58-85] 72  Resp:  [14-17] 17  SpO2:  [97 %-100 %] 99 %  BP: (121-142)/(68-91) 133/82    FHT: 130s   Variability: Moderate   Accelerations:  Reactive   Decelerations:  Absent   Category:  I   Contractions: Q2-4 minutes       SVE: 3/50/-3       Membranes:  AROM at    Augmentation: S/p V. Cytotec x3; Pitocin to be started at this time   GBS Status:  GBS unknown; PCN running   Pain: Epidural available upon request; desires natural childbirth at this time       Dispo: Titrate Pitocin as tolerated; will repeat SVE in 4 hours from pitocin initiation or sooner for maternal or fetal indication.      Discussed with Dr. Sanz.    Leticia Sauer MD  [employer] [facility], PGY-2  2:50 PM 2025

## 2025-03-13 LAB
BASOPHILS # BLD AUTO: 0.05 X10(3)/MCL
BASOPHILS NFR BLD AUTO: 0.4 %
EOSINOPHIL # BLD AUTO: 0.1 X10(3)/MCL (ref 0–0.9)
EOSINOPHIL NFR BLD AUTO: 0.8 %
ERYTHROCYTE [DISTWIDTH] IN BLOOD BY AUTOMATED COUNT: 20.4 % (ref 11.5–17)
HCT VFR BLD AUTO: 29.7 % (ref 37–47)
HGB BLD-MCNC: 9.5 G/DL (ref 12–16)
IMM GRANULOCYTES # BLD AUTO: 0.14 X10(3)/MCL (ref 0–0.04)
IMM GRANULOCYTES NFR BLD AUTO: 1.1 %
LYMPHOCYTES # BLD AUTO: 2.25 X10(3)/MCL (ref 0.6–4.6)
LYMPHOCYTES NFR BLD AUTO: 17.4 %
MCH RBC QN AUTO: 24.4 PG (ref 27–31)
MCHC RBC AUTO-ENTMCNC: 32 G/DL (ref 33–36)
MCV RBC AUTO: 76.3 FL (ref 80–94)
MONOCYTES # BLD AUTO: 0.82 X10(3)/MCL (ref 0.1–1.3)
MONOCYTES NFR BLD AUTO: 6.3 %
NEUTROPHILS # BLD AUTO: 9.59 X10(3)/MCL (ref 2.1–9.2)
NEUTROPHILS NFR BLD AUTO: 74 %
NRBC BLD AUTO-RTO: 0 %
PLATELET # BLD AUTO: 265 X10(3)/MCL (ref 130–400)
PMV BLD AUTO: 10.1 FL (ref 7.4–10.4)
RBC # BLD AUTO: 3.89 X10(6)/MCL (ref 4.2–5.4)
WBC # BLD AUTO: 12.95 X10(3)/MCL (ref 4.5–11.5)

## 2025-03-13 PROCEDURE — 25000003 PHARM REV CODE 250: Performed by: OBSTETRICS & GYNECOLOGY

## 2025-03-13 PROCEDURE — 11000001 HC ACUTE MED/SURG PRIVATE ROOM

## 2025-03-13 PROCEDURE — 36415 COLL VENOUS BLD VENIPUNCTURE: CPT | Performed by: OBSTETRICS & GYNECOLOGY

## 2025-03-13 PROCEDURE — 85025 COMPLETE CBC W/AUTO DIFF WBC: CPT | Performed by: OBSTETRICS & GYNECOLOGY

## 2025-03-13 RX ADMIN — IBUPROFEN 600 MG: 600 TABLET, FILM COATED ORAL at 11:03

## 2025-03-13 RX ADMIN — IBUPROFEN 600 MG: 600 TABLET, FILM COATED ORAL at 04:03

## 2025-03-13 RX ADMIN — IBUPROFEN 600 MG: 600 TABLET, FILM COATED ORAL at 10:03

## 2025-03-13 RX ADMIN — IBUPROFEN 600 MG: 600 TABLET, FILM COATED ORAL at 03:03

## 2025-03-13 NOTE — PLAN OF CARE
"  Problem: Adult Inpatient Plan of Care  Goal: Plan of Care Review  Outcome: Progressing  Goal: Patient-Specific Goal (Individualized)  Description: "I want to breast and bottle feed my baby"  Outcome: Progressing  Goal: Absence of Hospital-Acquired Illness or Injury  Outcome: Progressing  Goal: Optimal Comfort and Wellbeing  Outcome: Progressing  Goal: Readiness for Transition of Care  Outcome: Progressing     Problem: Infection  Goal: Absence of Infection Signs and Symptoms  Outcome: Progressing     Problem:  Fall Injury Risk  Goal: Absence of Fall, Infant Drop and Related Injury  Outcome: Progressing     Problem: Postpartum (Vaginal Delivery)  Goal: Successful Parent Role Transition  Outcome: Progressing  Goal: Hemostasis  Outcome: Progressing  Goal: Absence of Infection Signs and Symptoms  Outcome: Progressing  Goal: Anesthesia/Sedation Recovery  Outcome: Progressing  Goal: Optimal Pain Control and Function  Outcome: Progressing  Goal: Effective Urinary Elimination  Outcome: Progressing     Problem: Breastfeeding  Goal: Effective Breastfeeding  Outcome: Progressing     "

## 2025-03-13 NOTE — PROGRESS NOTES
I reviewed History, PE, A/P and medical record.  Services provided in outpatient department of a teaching hospital/facility, I was immediately available.  I agree with resident. Care provided was reasonable and necessary.   I discussed the patient with resident at time of visit. Case discussed with OB staff/Dr. Sanz.

## 2025-03-13 NOTE — CARE UPDATE
OB Transition of Care Note      I have received checkout from Dr. Graham regarding this patient.     Pt is a  with an IUP at 37w3d currently admitted for IOL.    Last Cervical Check:   Time:18:50pm  Dilation/Station/Effacement: /-1    Pregnancy Complications / Other Co-Morbid Conditions:   Gestation hypertensin  Advanced maternal age  Polyhydramnios (resolved)    Overnight Management:   GBS unknown, receiving prophylactic PCN    Please call [phone] from 2025 7PM to 3/13/25 7AM when delivery is imminent.     Rosette Hardin DO  [facility] Family Medicine Resident, [facility]

## 2025-03-13 NOTE — ANESTHESIA POSTPROCEDURE EVALUATION
Anesthesia Post Evaluation    Patient: Tiff Carrero    Procedure(s) Performed: * No procedures listed *    Final Anesthesia Type: regional (Regional comprises either epidural or spinal, and conversion to General if required)      Patient location during evaluation: labor & delivery  Patient participation: Yes- Able to Participate  Post-procedure mental status: @ baseline.  Post-procedure vital signs: reviewed and stable  Pain management: adequate  AQI66 BROOKE Mitigation: See pacu RN note for any measures applied.  PONV status: See postop meds for drugs used to control n/v if any.  Anesthetic complications: no      Cardiovascular status: stable  Respiratory status: @ baseline.            No complaints at this time.      Vitals Value Taken Time   /98 03/12/25 19:44   Temp 36.9 °C (98.4 °F) 03/12/25 17:40   Pulse 88 03/12/25 19:58   Resp 17 03/12/25 17:40   SpO2 99 % 03/12/25 19:55   Vitals shown include unfiled device data.      No case tracking events are documented in the log.      Pain/Héctor Score: No data recorded

## 2025-03-13 NOTE — OP NOTE
OPERATIVE REPORT    Date of Procedure: 2025    Patient Name: Tiff Carrero    MRN: [id number]    Surgeon: Familia Sanz MD    Assistant:     Pre-Operative Diagnosis:  Gestational hypertension   Induction at 37 weeks    Post-Operative Diagnosis:  Same   Delivery  Anesthesia: Epidural    Procedure:       Complications: No    Estimated Blood Loss (EBL): 300           Findings: Live Male    Specimens: Cord Blood    Description:  Patient was prepped and draped in a normal sterile fashion. The 2nd stage of labor was within normal limits. The infant's head delivered in NUPUR without any difficulties along with the shoulders and the rest of the infant. No Nuchal cords and Clear fluid noted. Mouth and nose were suctioned on the abdomen. Cord was doubly clamped and cut. The infant was handed to the waiting staff. Cord blood taken. The placenta was extracted spontaneously. There was No lacerations on the perineum and No laceration on the labia  . Estimated blood loss was 300 patient tolerated the procedure well and instrument and sponge count were correct patient was to recover in labor room for approximately 1 hour.

## 2025-03-13 NOTE — PLAN OF CARE
Problem: Breastfeeding  Goal: Effective Breastfeeding  Intervention: Promote Effective Breastfeeding  Flowsheets (Taken 3/13/2025 0045)  Breastfeeding Assistance: support offered  Parent-Child Attachment Promotion:   positive reinforcement provided   strengths emphasized  Intervention: Support Exclusive Breastfeeding Success  Flowsheets (Taken 3/13/2025 0045)  Supportive Measures:   decision-making supported   verbalization of feelings encouraged

## 2025-03-13 NOTE — PROGRESS NOTES
Post Vaginal Delivery Progress Note:     Subjective  Tiff Carrero is a 34 y.o.  s/p , PPD#1. Patient resting comfortably in bed. Pain is well controlled. Lochia similar to her menses. Tolerating regular diet without nausea/vomiting. Ambulating comfortably, voiding spontaneously, and passing flatus. Infant at bedside.     Denies headache, blurry vision, dizziness, chest pain, shortness of breath, RUQ pain, or calf pain. .    Objective:     Vitals:    25 2125 25 2150 25 0042 25 0426   BP: 130/65 (!) 141/81 119/72 122/79   Pulse:  70 71 67   Resp:       Temp:  98.5 °F (36.9 °C) 98.4 °F (36.9 °C) 98.1 °F (36.7 °C)   TempSrc:  Oral Oral Oral   SpO2:  99% 97% 99%   Weight:       Height:         General:  Alert and oriented, in no acute distress   Lungs:  Clear to auscultation bilaterally   Heart::  Regular rate and rhythm   Abdomen:   Soft, nondistended, normoactive bowel sounds    Pelvic:  Scant blood present on pad    Uterine Fundus:   Firm, below the umbilicus    Extremities:  No cords, erythema, warmth, or tenderness to palpation in bilateral lower extremities; trace edema      Labs  Recent Results (from the past 24 hours)   CBC with Differential    Collection Time: 25  5:59 AM   Result Value Ref Range    WBC 12.95 (H) 4.50 - 11.50 x10(3)/mcL    RBC 3.89 (L) 4.20 - 5.40 x10(6)/mcL    Hgb 9.5 (L) 12.0 - 16.0 g/dL    Hct 29.7 (L) 37.0 - 47.0 %    MCV 76.3 (L) 80.0 - 94.0 fL    MCH 24.4 (L) 27.0 - 31.0 pg    MCHC 32.0 (L) 33.0 - 36.0 g/dL    RDW 20.4 (H) 11.5 - 17.0 %    Platelet 265 130 - 400 x10(3)/mcL    MPV 10.1 7.4 - 10.4 fL    Neut % 74.0 %    Lymph % 17.4 %    Mono % 6.3 %    Eos % 0.8 %    Basophil % 0.4 %    Imm Grans % 1.1 %    Neut # 9.59 (H) 2.1 - 9.2 x10(3)/mcL    Lymph # 2.25 0.6 - 4.6 x10(3)/mcL    Mono # 0.82 0.1 - 1.3 x10(3)/mcL    Eos # 0.10 0 - 0.9 x10(3)/mcL    Baso # 0.05 <=0.2 x10(3)/mcL    Imm Gran # 0.14 (H) 0.00 - 0.04 x10(3)/mcL    NRBC% 0.0  %       Trended Lab Data:  Recent Labs   Lab 25  1850 25  0559   WBC 9.97 12.95*   HGB 10.7* 9.5*    265   MCV 75.7* 76.3*     Medications   ibuprofen  600 mg Oral Q6H    prenatal vitamin  1 tablet Oral Daily      benzocaine-lanolin   Topical (Top) Continuous PRN        dextrose 5% lactated ringers   Intravenous Continuous        fentanyl 2 mcg/mL with BUPivacaine 0.125% in sodium chloride 0.9% Epidural   Epidural Continuous        oxytocin  95 bessy-units/min Intravenous Continuous PRN         Assessment/Plan  34 y.o.  s/p , PPD#1. Clinically stable and doing well. Pregnancy/postpartum course complicated by:     Continue routine postpartum care.   Asymptomatic anemia:  Antepartum H/H 10.7/33.3 --> --> postpartum H/H 9.5/29.7.   No signs/symptoms of anemia this AM, continue to monitor  GHTN  Blood pressures overnight mild range with isolated severe range pressure following delivery  Continue to monitor for si/sxs of pre-eclampsia  Feeding: Bottle and Breastfeeding, continue to encourage.  Pain: scheduled ibuprofen and tylenol, PRN oxycodone for breakthrough pain.  PPBCM: Undecided   Dispo: continue to monitor, anticipate discharge to home on postpartum day #2 if meeting all goals.    Will discuss patient and plan of care with Dr. Umu Sauer MD  [facility], PGY-2  6:44 AM 2025

## 2025-03-14 VITALS
WEIGHT: 176 LBS | TEMPERATURE: 98 F | BODY MASS INDEX: 31.18 KG/M2 | HEART RATE: 70 BPM | OXYGEN SATURATION: 99 % | SYSTOLIC BLOOD PRESSURE: 138 MMHG | DIASTOLIC BLOOD PRESSURE: 89 MMHG | RESPIRATION RATE: 16 BRPM | HEIGHT: 63 IN

## 2025-03-14 LAB — BACTERIA SPEC CULT: NORMAL

## 2025-03-14 PROCEDURE — 25000003 PHARM REV CODE 250

## 2025-03-14 RX ORDER — ACETAMINOPHEN 500 MG
1000 TABLET ORAL EVERY 8 HOURS
Qty: 60 TABLET | Refills: 0 | Status: SHIPPED | OUTPATIENT
Start: 2025-03-14 | End: 2025-03-24

## 2025-03-14 RX ORDER — DOCUSATE SODIUM 100 MG/1
200 CAPSULE, LIQUID FILLED ORAL 2 TIMES DAILY PRN
Qty: 20 CAPSULE | Refills: 0 | Status: SHIPPED | OUTPATIENT
Start: 2025-03-14 | End: 2025-03-24

## 2025-03-14 RX ORDER — IBUPROFEN 600 MG/1
600 TABLET ORAL EVERY 6 HOURS
Qty: 40 TABLET | Refills: 0 | Status: SHIPPED | OUTPATIENT
Start: 2025-03-14 | End: 2025-03-24

## 2025-03-14 RX ORDER — POLYETHYLENE GLYCOL 3350 17 G/17G
17 POWDER, FOR SOLUTION ORAL DAILY
Status: DISCONTINUED | OUTPATIENT
Start: 2025-03-14 | End: 2025-03-14 | Stop reason: HOSPADM

## 2025-03-14 RX ADMIN — POLYETHYLENE GLYCOL 3350 17 G: 17 POWDER, FOR SOLUTION ORAL at 08:03

## 2025-03-14 NOTE — DISCHARGE SUMMARY
Delivery Discharge Summary  [facility]    Admission date: 3/11/2025  Discharge date: 2025    Admit Dx:   Induction of labor  GHTN  AMA  Hx of ASCUS    Discharge Dx:     GHTN  Hx of ASCUS    Procedure:     Hospital Course:  Tiff Carrero is a 34 y.o. now  female who was admitted on 3/11/2025 for induction of labor in the setting of GHTN. She had the benefit of an epidural and external fetal monitoring. Patient and infant tolerated labor well with pitocin. Patient delivered a viable  via  3/12/2025. Apgars 8/9. There was minimal blood loss. Review the Delivery Report for details.  Pt was in stable condition post delivery and was transferred to the Mother-Baby Unit. Her postpartum course was uncomplicated. For her GHTN, patient's blood pressures were normotensive to mild range throughout the entirety of her admission. On the date of discharge, patient's pain is controlled with oral pain medications. No fever or chills. She is ambulating without SOB or CP, and tolerating PO diet without N/V. Good urinary and bowel function. Reported lochia is within the normal range. Patient desires Depo Provera for birth control which she received prior to discharge. Patient has had no complaints or questions that were not addressed during the duration of her stay. Pt in stable condition and ready for discharge on PPD 2.     Physical exam:   Vitals:  Temp:  [97.9 °F (36.6 °C)-98.3 °F (36.8 °C)]   Pulse:  [58-67]   BP: (105-135)/(66-91)   SpO2:  [98 %]     Gen: AAO x 3, NAD  HEENT: NCAT, MMM, EOMI  CV: RRR, no murmurs; S1/S2  Resp: Clear to auscultation bilaterally with no wheezing, stridor, or upper airways sounds, good air movement, nontender chest  Abd: soft, +bowel sounds,  : uterus firm below umbilicus, lochia scant  Ext: no edema    Pertinent studies:    Delivery:    Episiotomy: None   Lacerations: None   Repair suture: None   Repair # of packets:     Blood loss (ml):    "    Birth information:  YOB: 2027   Time of birth: 7:33 PM   Sex: male   Delivery type: Vaginal, Spontaneous   Gestational Age: 37w3d     Measurements    Weight: 2835 g  Weight (lbs): 6 lb 4 oz  Length: 48.9 cm  Length (in): 19.25"  Head circumference: 34.3 cm         Delivery Clinician: Delivery Providers    Delivering clinician: Familia Sanz MD   Provider Role    Sinai Celis RN Registered Nurse    Abraham Osuna RN Registered Nurse    Ciara Gibson RN Registered Nurse             Additional  information:  Forceps:    Vacuum:    Breech:    Observed anomalies      Living?:     Apgars    Living status: Living  Apgar Component Scores:  1 min.:  5 min.:  10 min.:  15 min.:  20 min.:    Skin color:  0  1       Heart rate:  2  2       Reflex irritability:  2  2       Muscle tone:  2  2       Respiratory effort:  2  2       Total:  8  9       Apgars assigned by: D COLLETTE RN         Placenta: Delivered:       appearance    Labs:   Patient's Prenatal labs reviewed -   Blood Type:  Lab Results   Component Value Date    GROUPTRH O POS 2025      Rubella Immune Status   Lab Results   Component Value Date    RUBABIGG Positive 2024     Varicella Immune Status   Lab Results   Component Value Date    VZABGS Positive 2024     Most recent H/H:  Recent Labs   Lab 25  0559   HGB 9.5*   HCT 29.7*       Disposition: To home, self care    Follow Up: [facility] in  1 week for blood pressure check and 6 weeks for routine postpartum care    Patient Instructions:   1. Report to OB ED or call clinic for any bleeding >2 pads/hour for >2 hours, temperature >100.4, pain uncontrolled with medications, headaches, vision changes, CP, SOB, RUQ pain, or for any other concerns.  2. Pelvic rest x6 weeks and no tub baths x 2 weeks  3. Rx for Motrin and Tylenol provided.  4. Post partum contraception: Injection (DepoProvera)   5. GHTN   -Patient instructed to continue checking blood pressures 2x daily until " cleared during post-partum vision    Current Discharge Medication List        CONTINUE these medications which have NOT CHANGED    Details   PNV,calcium 72-iron-folic acid (PRENATAL VITAMIN PLUS LOW IRON) 27 mg iron- 1 mg Tab Take 1 tablet (1 each total) by mouth once daily.  Qty: 30 tablet, Refills: 11           Time spent on discharge: 30    Leticia Sauer MD  [facility], PGY-2  7:43 AM 03/14/2025'

## 2025-03-21 ENCOUNTER — OFFICE VISIT (OUTPATIENT)
Dept: FAMILY MEDICINE | Facility: CLINIC | Age: 35
End: 2025-03-21
Payer: MEDICAID

## 2025-03-21 VITALS
OXYGEN SATURATION: 98 % | SYSTOLIC BLOOD PRESSURE: 138 MMHG | BODY MASS INDEX: 29.66 KG/M2 | RESPIRATION RATE: 20 BRPM | HEART RATE: 75 BPM | DIASTOLIC BLOOD PRESSURE: 88 MMHG | WEIGHT: 167.38 LBS | HEIGHT: 63 IN | TEMPERATURE: 97 F

## 2025-03-21 DIAGNOSIS — Z01.30 BLOOD PRESSURE CHECK: Primary | ICD-10-CM

## 2025-03-21 LAB
ALBUMIN SERPL-MCNC: 3.3 G/DL (ref 3.5–5)
ALBUMIN/GLOB SERPL: 0.8 RATIO (ref 1.1–2)
ALP SERPL-CCNC: 98 UNIT/L (ref 40–150)
ALT SERPL-CCNC: 30 UNIT/L (ref 0–55)
ANION GAP SERPL CALC-SCNC: 9 MEQ/L
AST SERPL-CCNC: 20 UNIT/L (ref 11–45)
BASOPHILS # BLD AUTO: 0.05 X10(3)/MCL
BASOPHILS NFR BLD AUTO: 0.7 %
BILIRUB SERPL-MCNC: 0.2 MG/DL
BUN SERPL-MCNC: 8.6 MG/DL (ref 7–18.7)
CALCIUM SERPL-MCNC: 9.3 MG/DL (ref 8.4–10.2)
CHLORIDE SERPL-SCNC: 107 MMOL/L (ref 98–107)
CO2 SERPL-SCNC: 26 MMOL/L (ref 22–29)
CREAT SERPL-MCNC: 0.61 MG/DL (ref 0.55–1.02)
CREAT UR-MCNC: 26.1 MG/DL (ref 45–106)
CREAT/UREA NIT SERPL: 14
EOSINOPHIL # BLD AUTO: 0.23 X10(3)/MCL (ref 0–0.9)
EOSINOPHIL NFR BLD AUTO: 3.2 %
ERYTHROCYTE [DISTWIDTH] IN BLOOD BY AUTOMATED COUNT: 19 % (ref 11.5–17)
GFR SERPLBLD CREATININE-BSD FMLA CKD-EPI: >60 ML/MIN/1.73/M2
GLOBULIN SER-MCNC: 4.3 GM/DL (ref 2.4–3.5)
GLUCOSE SERPL-MCNC: 84 MG/DL (ref 74–100)
HCT VFR BLD AUTO: 35.7 % (ref 37–47)
HGB BLD-MCNC: 11 G/DL (ref 12–16)
IMM GRANULOCYTES # BLD AUTO: 0.02 X10(3)/MCL (ref 0–0.04)
IMM GRANULOCYTES NFR BLD AUTO: 0.3 %
LDH SERPL-CCNC: 237 U/L (ref 125–220)
LYMPHOCYTES # BLD AUTO: 1.96 X10(3)/MCL (ref 0.6–4.6)
LYMPHOCYTES NFR BLD AUTO: 27.7 %
MCH RBC QN AUTO: 24.2 PG (ref 27–31)
MCHC RBC AUTO-ENTMCNC: 30.8 G/DL (ref 33–36)
MCV RBC AUTO: 78.6 FL (ref 80–94)
MONOCYTES # BLD AUTO: 0.5 X10(3)/MCL (ref 0.1–1.3)
MONOCYTES NFR BLD AUTO: 7.1 %
NEUTROPHILS # BLD AUTO: 4.32 X10(3)/MCL (ref 2.1–9.2)
NEUTROPHILS NFR BLD AUTO: 61 %
NRBC BLD AUTO-RTO: 0 %
PLATELET # BLD AUTO: 431 X10(3)/MCL (ref 130–400)
PMV BLD AUTO: 9.3 FL (ref 7.4–10.4)
POTASSIUM SERPL-SCNC: 3.6 MMOL/L (ref 3.5–5.1)
PROT SERPL-MCNC: 7.6 GM/DL (ref 6.4–8.3)
PROT UR STRIP-MCNC: <6.8 MG/DL
RBC # BLD AUTO: 4.54 X10(6)/MCL (ref 4.2–5.4)
SODIUM SERPL-SCNC: 142 MMOL/L (ref 136–145)
WBC # BLD AUTO: 7.08 X10(3)/MCL (ref 4.5–11.5)

## 2025-03-21 PROCEDURE — 85025 COMPLETE CBC W/AUTO DIFF WBC: CPT

## 2025-03-21 PROCEDURE — 80053 COMPREHEN METABOLIC PANEL: CPT

## 2025-03-21 PROCEDURE — 84156 ASSAY OF PROTEIN URINE: CPT

## 2025-03-21 PROCEDURE — 83615 LACTATE (LD) (LDH) ENZYME: CPT

## 2025-03-21 PROCEDURE — 36415 COLL VENOUS BLD VENIPUNCTURE: CPT

## 2025-03-21 PROCEDURE — 99214 OFFICE O/P EST MOD 30 MIN: CPT | Mod: PBBFAC

## 2025-03-21 RX ORDER — NEBULIZER AND COMPRESSOR
1 EACH MISCELLANEOUS DAILY
Qty: 1 EACH | Refills: 0 | COMMUNITY
Start: 2025-03-21

## 2025-03-21 RX ORDER — NIFEDIPINE 30 MG/1
30 TABLET, EXTENDED RELEASE ORAL DAILY
Qty: 30 TABLET | Refills: 11 | Status: CANCELLED | OUTPATIENT
Start: 2025-03-21 | End: 2026-03-21

## 2025-03-21 RX ORDER — NIFEDIPINE 30 MG/1
30 TABLET, EXTENDED RELEASE ORAL DAILY
Qty: 30 TABLET | Refills: 2 | Status: SHIPPED | OUTPATIENT
Start: 2025-03-21 | End: 2026-03-21

## 2025-03-21 RX ORDER — ACETAMINOPHEN 500 MG
1 TABLET ORAL DAILY
Qty: 1 EACH | Refills: 0 | Status: SHIPPED | OUTPATIENT
Start: 2025-03-21

## 2025-03-21 NOTE — PROGRESS NOTES
"[facility] Clinic Note    CHIEF COMPLAINT:  Chief Complaint   Patient presents with    Hypertension     Blood pressure check     [place]  #[id number] used for this visit.      HISTORY OF  PRESENT ILLNESS:  Tiff Carrero is a 34 y.o.  who presents to clinic today for 1 week blood pressure check.   She is s/p  on 3/12/2025. Pregnancy was complicated by Gestational HTN in 3rd trimester, resolved polyhydramnios.    Today, blood pressure in clinic is 138/88. Patient denies any headache, dizziness, lightheadedness, chest pain, shortness of breath. She has not been checking her blood pressure at home due to not having a BP monitor. Not currently on any medications. Baby is doing well. She is breast and bottle feeding. Desires Nexplanon for contraception.       Current Outpatient Medications   Medication Instructions    acetaminophen (TYLENOL) 1,000 mg, Oral, Every 8 hours    BLOOD PRESSURE CUFF Misc 1 each, Misc.(Non-Drug; Combo Route), Daily    blood pressure monitor Kit 1 kit, Misc.(Non-Drug; Combo Route), Daily    docusate sodium (COLACE) 200 mg, Oral, 2 times daily PRN    ibuprofen (ADVIL,MOTRIN) 600 mg, Oral, Every 6 hours    NIFEdipine (PROCARDIA-XL) 30 mg, Oral, Daily    PNV,calcium 72-iron-folic acid (PRENATAL VITAMIN PLUS LOW IRON) 27 mg iron- 1 mg Tab 1 each, Oral, Daily         REVIEW OF SYSTEMS:  See HPI      PHYSICAL EXAMINATION:  Vitals:    25 0956   BP: 138/88   BP Location: Right arm   Patient Position: Sitting   Pulse: 75   Resp: 20   Temp: 97.4 °F (36.3 °C)   TempSrc: Oral   SpO2: 98%   Weight: 75.9 kg (167 lb 6.4 oz)   Height: 5' 3" (1.6 m)       General:  VSS. No acute distress.   Respiratory: clear to ascultation in all lung fields  Cardiovascular:  RRR, no additional heart sounds heard. No lower extremity swelling.  ABD: BS +, soft, nontender  Musculoskeletal:  Full range of motion of all extremities  Neurologic:  A&O X 3.           ASSESSMENT/PLAN:    1) " Blood pressure check s/p   Hx of GHTN in 3rd trimester  Initial SBP in clinic today 140's. Manual repeat 138/88  Will order pre-eclampsia labs (CBC, CMP, LDH, urine protein/creatinine ratio)  Discussed with patient about initiating BP medication since she has been having mid range elevated blood pressures since prior to delivery. Patient was open to this. Will send rx for Procardia XL 30mg qd.   Counseled patient to keep BP log and bring for review at next appointment. Rx sent for BP monitor and cuff.  ED precautions given for elevated BP > 140, consistent HA, blurry vision, dizziness, lower extremity swelling/        RTC in 2 weeks, or sooner if needed. (BP log review)        Alla Kaiser M.D  [facility] Family Medicine Resident, HO-II

## 2025-04-02 ENCOUNTER — PATIENT MESSAGE (OUTPATIENT)
Dept: MATERNAL FETAL MEDICINE | Facility: CLINIC | Age: 35
End: 2025-04-02
Payer: MEDICAID

## 2025-04-04 ENCOUNTER — OFFICE VISIT (OUTPATIENT)
Dept: FAMILY MEDICINE | Facility: CLINIC | Age: 35
End: 2025-04-04
Payer: MEDICAID

## 2025-04-04 VITALS
TEMPERATURE: 99 F | WEIGHT: 165 LBS | DIASTOLIC BLOOD PRESSURE: 81 MMHG | OXYGEN SATURATION: 99 % | HEART RATE: 81 BPM | SYSTOLIC BLOOD PRESSURE: 120 MMHG | HEIGHT: 63 IN | BODY MASS INDEX: 29.23 KG/M2

## 2025-04-04 DIAGNOSIS — Z01.30 BLOOD PRESSURE CHECK: ICD-10-CM

## 2025-04-04 PROCEDURE — 99213 OFFICE O/P EST LOW 20 MIN: CPT | Mod: PBBFAC

## 2025-04-04 NOTE — PROGRESS NOTES
"  Postpartum OB Clinic    Chief Complaint  Chief Complaint   Patient presents with    Follow-up     Post part visit. No concerns.          History of Present Illness  Tiff Carrero is a 34 y.o.  S/P  Post-Partum on 3/12/25 here at clinic for 3wk f/u visit and blood pressure check.    Pregnancy complicated by Gestational HTN in 3rd trimester and resolved polyhydramnios . Blood pressure 120/81 today. Denies any headache, dizziness, lightheadedness, chest pain, or SOB. . Reports not checking ambulatory blood pressure at home.  Compliant with Procardia XL 30mg d     Patient is doing well today; has support at home and is currently Breast and Bottle feeding.   She denies depressed mood or suicidal/homicidal ideations. States infant is Home, doing well. Denies HA, light-headedness/dizziness, visual changes, CP, le edema, SOB, abdominal pain, n/v, dec appetite. Denies resumption of sexual activity. In touch with the WIC, trying to get approved. Denies any other social needs.    Information for the patient's :  Luis Miguel ButlerClaudy [[id number]]     Delivery:    Episiotomy: None   Lacerations: None   Repair suture: None   Repair # of packets:     Blood loss (ml):       Birth information:  YOB: 2027   Time of birth: 7:33 PM   Sex: male   Delivery type: Vaginal, Spontaneous   Gestational Age: 37w3d     Measurements    Weight: 2835 g  Weight (lbs): 6 lb 4 oz  Length: 48.9 cm  Length (in): 19.25"  Head circumference: 34.3 cm         Delivery Clinician: Delivery Providers    Delivering clinician: Familia Sanz MD   Provider Role    Sinai Celis RN Registered Nurse    Abraham Osuna RN Registered Nurse    Ciara Gibson RN Registered Nurse             Additional  information:  Forceps:    Vacuum:    Breech:    Observed anomalies      Living?:     Apgars    Living status: Living  Apgar Component Scores:  1 min.:  5 min.:  10 min.:  15 min.:  20 min.:    Skin color:  0  1    "    Heart rate:  2  2       Reflex irritability:  2  2       Muscle tone:  2  2       Respiratory effort:  2  2       Total:  8  9       Apgars assigned by: D COLLETTE RN         Placenta: Delivered:       appearance         ROS:  - see HPI    Physical Exam:  Temp:  [98.5 °F (36.9 °C)]   Pulse:  [81]   BP: (120)/(81)   SpO2:  [99 %]   General:  VSS, afebrile. No acute distress.   Respiratory: Non labored.  No coughing.  Cardiovascular:  No peripheral edema.  DP pulses palpable bilaterally.   ABD: BS+, Soft, nontender  Musculoskeletal:  Full range of motion of all extremities; no joint deformities or edema.   Neurologic:  A&O X 3.    Psychiatric:  Calm, cooperative.  Mood and effect normal.  Responses appropriate.            Medication List with Changes/Refills   Current Medications    BLOOD PRESSURE CUFF MISC    1 each by Misc.(Non-Drug; Combo Route) route Daily.    BLOOD PRESSURE MONITOR KIT    1 kit by Misc.(Non-Drug; Combo Route) route once daily.    NIFEDIPINE (PROCARDIA-XL) 30 MG (OSM) 24 HR TABLET    Take 1 tablet (30 mg total) by mouth once daily.    PNV,CALCIUM 72-IRON-FOLIC ACID (PRENATAL VITAMIN PLUS LOW IRON) 27 MG IRON- 1 MG TAB    Take 1 tablet (1 each total) by mouth once daily.         Assessment / Plan:     Status post  routine postpartum follow-up  - instructed patient on proper wound care, to keep the incision area dry and clean   - Educated pt on signs and sx of post partum blues, post partum depression and post partum psychosis.   - Plans to breast feed and bottle feed  - Contraception: Nexplanon    - Patient is not a [facility] patient but will estbalish with us, baby is brought to our clinic    Gestational HTN  - Continue Procardia XL 30mg daily  - Encouraged ambulatory bp checks  - ED precautions given for elevated BP > 140, consistent HA, blurry vision, dizziness, lower extremity swelling  - Pre-eclampsia labs at last visit with mildly elevated LDH at 237   - Discussed importance of checking  ambulatory blood pressures at home, showed how to get cuff from [employer]  - will continue medication until around 6-8 weeks postpartum and then try to wean off    Will keep currently scheduled post partum visit on 4/24/25    Follow up:     4/24/25    Marco Martines MD   [facility], HO-II  04/04/2025

## 2025-04-29 ENCOUNTER — OFFICE VISIT (OUTPATIENT)
Dept: FAMILY MEDICINE | Facility: CLINIC | Age: 35
End: 2025-04-29
Payer: MEDICAID

## 2025-04-29 VITALS
WEIGHT: 167 LBS | DIASTOLIC BLOOD PRESSURE: 71 MMHG | HEIGHT: 63 IN | HEART RATE: 78 BPM | OXYGEN SATURATION: 100 % | TEMPERATURE: 98 F | RESPIRATION RATE: 18 BRPM | BODY MASS INDEX: 29.59 KG/M2 | SYSTOLIC BLOOD PRESSURE: 113 MMHG

## 2025-04-29 PROCEDURE — 99213 OFFICE O/P EST LOW 20 MIN: CPT | Mod: PBBFAC

## 2025-04-29 NOTE — PROGRESS NOTES
"  Postpartum OB Clinic    Chief Complaint  Chief Complaint   Patient presents with    Follow-up     6 Week Post Partum. Patient would like birth control patch. No Complaints.          History of Present Illness  Tiff Carrero is a 34 y.o.  S/P  Post-Partum here at clinic for 6wk f/u visit. Patient delievered on 3/12/25.    Pregnancy complicated by Gestational HTN in 3rd trimester and resolved polyhydramnios . Blood pressure 113/71 today. Denies any headache, dizziness, lightheadedness, chest pain, or SOB. . Reports not checking ambulatory blood pressure at home.  Was not compliant with Procardia XL 30mg d, but blood pressure is at goal today     Patient is doing well today; has support at home and is currently Breast and Bottle feeding.   She denies depressed mood or suicidal/homicidal ideations. States infant is Home, doing well. Denies HA, light-headedness/dizziness, visual changes, CP, le edema, SOB, abdominal pain, n/v, dec appetite. Denies resumption of sexual activity. In touch with the WIC, trying to get approved. Denies any other social needs.    Information for the patient's :  [place] [[id number]]     Delivery:    Episiotomy: None   Lacerations: None   Repair suture: None   Repair # of packets:     Blood loss (ml):       Birth information:  YOB: 2027   Time of birth: 7:33 PM   Sex: male   Delivery type: Vaginal, Spontaneous   Gestational Age: 37w3d     Measurements    Weight: 2835 g  Weight (lbs): 6 lb 4 oz  Length: 48.9 cm  Length (in): 19.25"  Head circumference: 34.3 cm         Delivery Clinician: Delivery Providers    Delivering clinician: Familia Sanz MD   Provider Role    Sinai Celis RN Registered Nurse    Abraham Osuna RN Registered Nurse    Ciara Gibson RN Registered Nurse             Additional  information:  Forceps:    Vacuum:    Breech:    Observed anomalies      Living?:     Apgars    Living status: Living  Apgar " Component Scores:  1 min.:  5 min.:  10 min.:  15 min.:  20 min.:    Skin color:  0  1       Heart rate:  2  2       Reflex irritability:  2  2       Muscle tone:  2  2       Respiratory effort:  2  2       Total:  8  9       Apgars assigned by: D COLLETTE RN         Placenta: Delivered:       appearance         ROS:  - see HPI    Physical Exam:  Temp:  [98.3 °F (36.8 °C)]   Pulse:  [78]   Resp:  [18]   BP: (113)/(71)   SpO2:  [100 %]   General:  VSS, afebrile. No acute distress.   Respiratory: Non labored.  No coughing.  Cardiovascular:  No peripheral edema.  DP pulses palpable bilaterally.   ABD: BS+, Soft, nontender  Musculoskeletal:  Full range of motion of all extremities; no joint deformities or edema.   Neurologic:  A&O X 3.    Psychiatric:  Calm, cooperative.  Mood and effect normal.  Responses appropriate.     Allen  Depression Scale: 0  Allen  Depression Scale:  In the Past 7 Days  I have been able to laugh and see the funny side of things.: As much as I always could  I have looked forward with enjoyment to things.: As much as I ever did  I have blamed myself unnecessarily when things went wrong.: No, never  I have been anxious or worried for no good reason.: No, not at all  I have felt scared or panicky for no good reason.: No, not at all  Things have been getting on top of me.: No, I have been coping as well as ever  I have been so unhappy that I have had difficulty sleeping.: Not at all  I have felt sad or miserable.: No, not at all  I have been so unhappy that I have been crying.: No, never  The thought of harming myself has occurred to me.: Never  Allen  Depression Scale Total: 0      Medication List with Changes/Refills   Current Medications    BLOOD PRESSURE CUFF MISC    1 each by Misc.(Non-Drug; Combo Route) route Daily.    BLOOD PRESSURE MONITOR KIT    1 kit by Misc.(Non-Drug; Combo Route) route once daily.    PNV,CALCIUM 72-IRON-FOLIC ACID (PRENATAL VITAMIN  PLUS LOW IRON) 27 MG IRON- 1 MG TAB    Take 1 tablet (1 each total) by mouth once daily.   Discontinued Medications    NIFEDIPINE (PROCARDIA-XL) 30 MG (OSM) 24 HR TABLET    Take 1 tablet (30 mg total) by mouth once daily.         Assessment / Plan:     Status post  routine postpartum follow-up  - instructed patient on proper wound care, to keep the incision area dry and clean   - Educated pt on signs and sx of post partum blues, post partum depression and post partum psychosis.   - Plans to breast feed and bottle feed  - Contraception: Nexplanon  , we will return to clinic in 1 week for procedure  - Patient is not a [facility] patient but will estbalish with us, baby is brought to our clinic   - 1 week procedure visit for Nexplanon placement    Gestational HTN  - discontinue Procardia XL 30mg daily  - Encouraged ambulatory bp checks  - ED precautions given for elevated BP > 140, consistent HA, blurry vision, dizziness, lower extremity swelling  - Pre-eclampsia labs at prior visit with mildly elevated LDH at 237     Follow up:     RTC: Follow up in about 1 week (around 2025) for Procedure visit for Nexplanon placement.    Marco Martines MD   [facility], HO-II  2025

## (undated) DEVICE — DRAPE UNDERBUTTOCKS PCH STRL

## (undated) DEVICE — LINER MEDI-VAC PPV FLTR 1500CC

## (undated) DEVICE — KIT SURGICAL TURNOVER

## (undated) DEVICE — CATH RED RUBBER LATEX 14F 16IN

## (undated) DEVICE — SPONGE LAP 18X18 PREWASHED

## (undated) DEVICE — SOLIDIFIER BOTTLE 1500CC

## (undated) DEVICE — PAD PREP CUFFED NS 24X48IN

## (undated) DEVICE — PAD CURAD NONADH 3X4IN

## (undated) DEVICE — SPONGE GAUZE 16PLY 4X4

## (undated) DEVICE — TUBING COLLECTION PVC D AND C

## (undated) DEVICE — TOWEL OR DISP STRL BLUE 4/PK

## (undated) DEVICE — PACK DRAPE PERI/GYN TIBURON

## (undated) DEVICE — MARKER WRITESITE SKIN CHLRAPRP

## (undated) DEVICE — GOWN POLY REINF BRTH SLV XL

## (undated) DEVICE — SOL 9P NACL IRR PIC IL